# Patient Record
Sex: MALE | Race: WHITE | NOT HISPANIC OR LATINO
[De-identification: names, ages, dates, MRNs, and addresses within clinical notes are randomized per-mention and may not be internally consistent; named-entity substitution may affect disease eponyms.]

---

## 2017-02-22 ENCOUNTER — MEDICATION RENEWAL (OUTPATIENT)
Age: 62
End: 2017-02-22

## 2017-05-15 ENCOUNTER — MEDICATION RENEWAL (OUTPATIENT)
Age: 62
End: 2017-05-15

## 2017-07-19 ENCOUNTER — MEDICATION RENEWAL (OUTPATIENT)
Age: 62
End: 2017-07-19

## 2017-10-04 ENCOUNTER — MEDICATION RENEWAL (OUTPATIENT)
Age: 62
End: 2017-10-04

## 2018-01-12 ENCOUNTER — RX RENEWAL (OUTPATIENT)
Age: 63
End: 2018-01-12

## 2018-03-29 ENCOUNTER — RX RENEWAL (OUTPATIENT)
Age: 63
End: 2018-03-29

## 2018-11-09 ENCOUNTER — RX RENEWAL (OUTPATIENT)
Age: 63
End: 2018-11-09

## 2019-06-27 ENCOUNTER — RX RENEWAL (OUTPATIENT)
Age: 64
End: 2019-06-27

## 2019-06-28 ENCOUNTER — RX RENEWAL (OUTPATIENT)
Age: 64
End: 2019-06-28

## 2019-07-16 ENCOUNTER — APPOINTMENT (OUTPATIENT)
Dept: PULMONOLOGY | Facility: CLINIC | Age: 64
End: 2019-07-16

## 2020-03-11 ENCOUNTER — RX RENEWAL (OUTPATIENT)
Age: 65
End: 2020-03-11

## 2020-05-01 ENCOUNTER — APPOINTMENT (OUTPATIENT)
Dept: PULMONOLOGY | Facility: CLINIC | Age: 65
End: 2020-05-01

## 2020-12-05 ENCOUNTER — TRANSCRIPTION ENCOUNTER (OUTPATIENT)
Age: 65
End: 2020-12-05

## 2022-06-10 ENCOUNTER — INPATIENT (INPATIENT)
Facility: HOSPITAL | Age: 67
LOS: 13 days | Discharge: HOME | End: 2022-06-24
Attending: PSYCHIATRY & NEUROLOGY | Admitting: PSYCHIATRY & NEUROLOGY
Payer: COMMERCIAL

## 2022-06-10 VITALS
RESPIRATION RATE: 18 BRPM | HEART RATE: 88 BPM | SYSTOLIC BLOOD PRESSURE: 143 MMHG | TEMPERATURE: 99 F | DIASTOLIC BLOOD PRESSURE: 90 MMHG | OXYGEN SATURATION: 97 %

## 2022-06-10 PROCEDURE — 99285 EMERGENCY DEPT VISIT HI MDM: CPT

## 2022-06-10 PROCEDURE — 99053 MED SERV 10PM-8AM 24 HR FAC: CPT
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
## 2022-06-10 NOTE — ED BEHAVIORAL HEALTH ASSESSMENT NOTE - DESCRIPTION
on SSI s/p resection of meningioma,  over 30 years in the process of divorce, 1 adult son. TBI, hypothyroid, tinnitus, restless but cooperative   ICU Vital Signs Last 24 Hrs  T(C): 37.1 (10 Gavin 2022 20:35), Max: 37.1 (10 Gavin 2022 20:35)  T(F): 98.7 (10 Gavin 2022 20:35), Max: 98.7 (10 Gavin 2022 20:35)  HR: 88 (10 Gavin 2022 20:35) (88 - 88)  BP: 143/90 (10 Gavin 2022 20:35) (143/90 - 143/90)  BP(mean): --  ABP: --  ABP(mean): --  RR: 18 (10 Gavin 2022 20:35) (18 - 18)  SpO2: 97% (10 Gavin 2022 20:35) (97% - 97%)
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
## 2022-06-10 NOTE — ED PROVIDER NOTE - CLINICAL SUMMARY MEDICAL DECISION MAKING FREE TEXT BOX
Medical evaluation performed. There is no clinical evidence of intoxication or any acute medical problem requiring immediate intervention. Patient is awaiting psychiatric consultation. Final disposition will be determined by psychiatrist. 68 y/o M hx Pituitary Tumor, Seizure     Medical evaluation performed. There is no clinical evidence of intoxication or any acute medical problem requiring immediate intervention. Patient is awaiting psychiatric consultation. Final disposition will be determined by psychiatrist. 66 y/o M hx Pituitary Tumor, Seizure    Labs, Urine Tox/UA, EKG, CT head     Medical evaluation performed. There is no clinical evidence of intoxication or any acute medical problem requiring immediate intervention. Patient is awaiting psychiatric consultation. Final disposition will be determined by psychiatrist.
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
## 2022-06-10 NOTE — ED BEHAVIORAL HEALTH ASSESSMENT NOTE - CURRENT MEDICATION
Zoloft, Zoloft 200 mg daily, Vitamin D 50,000, Synthroid 200 mcg daily, testosterone, Lamictal 50 mg bid, proventil inhaler prn, Symbicort
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
## 2022-06-10 NOTE — ED BEHAVIORAL HEALTH ASSESSMENT NOTE - OTHER
family will remain in ED overnight loss of marriage, relationship, disabled becomes irritable easily.

## 2022-06-10 NOTE — ED PROVIDER NOTE - NSICDXPASTMEDICALHX_GEN_ALL_CORE_FT
PAST MEDICAL HISTORY:  Asthma     H/O: pituitary tumor     HLD (hyperlipidemia)     HTN (hypertension)     Major depression     Seizure
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
## 2022-06-10 NOTE — ED PROVIDER NOTE - PROGRESS NOTE DETAILS
Gong: Patient noted to attempt to elope from low acuity .  Pt left through ambulance emtrance and was stopped by staff in ambulance parking lot.  Pt aggressive trying to go home.  Now reporting shortness of breath demanding albuterol and hydrocortisone.  Pt denies chest pain, nausea, vomiting, fevers.  Brought back via stretcher back to .  Ordered home meds.  Reassess. Ativan ordered for patient.

## 2022-06-10 NOTE — ED BEHAVIORAL HEALTH ASSESSMENT NOTE - MEDICAL ISSUES AND PLAN (INCLUDE STANDING AND PRN MEDICATION)
continue Keppra 500mg bid, Synthroid 200 mcg daily, Lamictal 50 mg bid, Albuterol prn, Symbicort, Vitamin D 79666 units biweekly continue Keppra 500mg bid, Synthroid 200 mcg daily, Lamictal 50 mg bid, Albuterol prn, Symbicort, Vitamin D 20705 units biweekly Please verify all medications with patient's pharmacy.

## 2022-06-10 NOTE — ED ADULT TRIAGE NOTE - CHIEF COMPLAINT QUOTE
Brought in by EMS, c/o anxiety. Pt took Adderall for 8 yrs, was off medication for past 2 wks per PCP instructions. No complaints of chest pain, headache, nausea, dizziness, vomiting  SOB, fever, or chills. PMH pituitary tumor, meningioma, hypothyroidism, anxiety

## 2022-06-11 DIAGNOSIS — F43.20 ADJUSTMENT DISORDER, UNSPECIFIED: ICD-10-CM

## 2022-06-11 DIAGNOSIS — S06.9X9A UNSPECIFIED INTRACRANIAL INJURY WITH LOSS OF CONSCIOUSNESS OF UNSPECIFIED DURATION, INITIAL ENCOUNTER: ICD-10-CM

## 2022-06-11 DIAGNOSIS — F39 UNSPECIFIED MOOD [AFFECTIVE] DISORDER: ICD-10-CM

## 2022-06-11 LAB
ALBUMIN SERPL ELPH-MCNC: 4.3 G/DL — SIGNIFICANT CHANGE UP (ref 3.3–5)
ALP SERPL-CCNC: 77 U/L — SIGNIFICANT CHANGE UP (ref 40–120)
ALT FLD-CCNC: 31 U/L — SIGNIFICANT CHANGE UP (ref 4–41)
ANION GAP SERPL CALC-SCNC: 10 MMOL/L — SIGNIFICANT CHANGE UP (ref 7–14)
APAP SERPL-MCNC: <10 UG/ML — LOW (ref 15–25)
AST SERPL-CCNC: 33 U/L — SIGNIFICANT CHANGE UP (ref 4–40)
BASOPHILS # BLD AUTO: 0.03 K/UL — SIGNIFICANT CHANGE UP (ref 0–0.2)
BASOPHILS NFR BLD AUTO: 0.4 % — SIGNIFICANT CHANGE UP (ref 0–2)
BILIRUB SERPL-MCNC: 0.3 MG/DL — SIGNIFICANT CHANGE UP (ref 0.2–1.2)
BUN SERPL-MCNC: 17 MG/DL — SIGNIFICANT CHANGE UP (ref 7–23)
CALCIUM SERPL-MCNC: 9.5 MG/DL — SIGNIFICANT CHANGE UP (ref 8.4–10.5)
CHLORIDE SERPL-SCNC: 105 MMOL/L — SIGNIFICANT CHANGE UP (ref 98–107)
CO2 SERPL-SCNC: 26 MMOL/L — SIGNIFICANT CHANGE UP (ref 22–31)
CREAT SERPL-MCNC: 1.11 MG/DL — SIGNIFICANT CHANGE UP (ref 0.5–1.3)
EGFR: 73 ML/MIN/1.73M2 — SIGNIFICANT CHANGE UP
EOSINOPHIL # BLD AUTO: 0.25 K/UL — SIGNIFICANT CHANGE UP (ref 0–0.5)
EOSINOPHIL NFR BLD AUTO: 3.5 % — SIGNIFICANT CHANGE UP (ref 0–6)
ETHANOL SERPL-MCNC: <10 MG/DL — SIGNIFICANT CHANGE UP
FLUAV AG NPH QL: SIGNIFICANT CHANGE UP
FLUBV AG NPH QL: SIGNIFICANT CHANGE UP
GLUCOSE SERPL-MCNC: 90 MG/DL — SIGNIFICANT CHANGE UP (ref 70–99)
HCT VFR BLD CALC: 48.9 % — SIGNIFICANT CHANGE UP (ref 39–50)
HGB BLD-MCNC: 16.3 G/DL — SIGNIFICANT CHANGE UP (ref 13–17)
IANC: 4.3 K/UL — SIGNIFICANT CHANGE UP (ref 1.8–7.4)
IMM GRANULOCYTES NFR BLD AUTO: 0.4 % — SIGNIFICANT CHANGE UP (ref 0–1.5)
LYMPHOCYTES # BLD AUTO: 1.92 K/UL — SIGNIFICANT CHANGE UP (ref 1–3.3)
LYMPHOCYTES # BLD AUTO: 26.9 % — SIGNIFICANT CHANGE UP (ref 13–44)
MCHC RBC-ENTMCNC: 29.6 PG — SIGNIFICANT CHANGE UP (ref 27–34)
MCHC RBC-ENTMCNC: 33.3 GM/DL — SIGNIFICANT CHANGE UP (ref 32–36)
MCV RBC AUTO: 88.7 FL — SIGNIFICANT CHANGE UP (ref 80–100)
MONOCYTES # BLD AUTO: 0.61 K/UL — SIGNIFICANT CHANGE UP (ref 0–0.9)
MONOCYTES NFR BLD AUTO: 8.5 % — SIGNIFICANT CHANGE UP (ref 2–14)
NEUTROPHILS # BLD AUTO: 4.3 K/UL — SIGNIFICANT CHANGE UP (ref 1.8–7.4)
NEUTROPHILS NFR BLD AUTO: 60.3 % — SIGNIFICANT CHANGE UP (ref 43–77)
NRBC # BLD: 0 /100 WBCS — SIGNIFICANT CHANGE UP
NRBC # FLD: 0 K/UL — SIGNIFICANT CHANGE UP
PLATELET # BLD AUTO: 296 K/UL — SIGNIFICANT CHANGE UP (ref 150–400)
POTASSIUM SERPL-MCNC: 3.8 MMOL/L — SIGNIFICANT CHANGE UP (ref 3.5–5.3)
POTASSIUM SERPL-SCNC: 3.8 MMOL/L — SIGNIFICANT CHANGE UP (ref 3.5–5.3)
PROT SERPL-MCNC: 6.9 G/DL — SIGNIFICANT CHANGE UP (ref 6–8.3)
RBC # BLD: 5.51 M/UL — SIGNIFICANT CHANGE UP (ref 4.2–5.8)
RBC # FLD: 12.7 % — SIGNIFICANT CHANGE UP (ref 10.3–14.5)
RSV RNA NPH QL NAA+NON-PROBE: SIGNIFICANT CHANGE UP
SALICYLATES SERPL-MCNC: <0.3 MG/DL — LOW (ref 15–30)
SARS-COV-2 RNA SPEC QL NAA+PROBE: SIGNIFICANT CHANGE UP
SODIUM SERPL-SCNC: 141 MMOL/L — SIGNIFICANT CHANGE UP (ref 135–145)
TOXICOLOGY SCREEN, DRUGS OF ABUSE, SERUM RESULT: SIGNIFICANT CHANGE UP
TSH SERPL-MCNC: <0.1 UIU/ML — LOW (ref 0.27–4.2)
WBC # BLD: 7.14 K/UL — SIGNIFICANT CHANGE UP (ref 3.8–10.5)
WBC # FLD AUTO: 7.14 K/UL — SIGNIFICANT CHANGE UP (ref 3.8–10.5)

## 2022-06-11 PROCEDURE — 70450 CT HEAD/BRAIN W/O DYE: CPT | Mod: 26,MA

## 2022-06-11 RX ORDER — LAMOTRIGINE 25 MG/1
50 TABLET, ORALLY DISINTEGRATING ORAL
Refills: 0 | Status: DISCONTINUED | OUTPATIENT
Start: 2022-06-11 | End: 2022-06-13

## 2022-06-11 RX ORDER — LEVOTHYROXINE SODIUM 125 MCG
200 TABLET ORAL DAILY
Refills: 0 | Status: DISCONTINUED | OUTPATIENT
Start: 2022-06-12 | End: 2022-06-12

## 2022-06-11 RX ORDER — DIPHENHYDRAMINE HCL 50 MG
50 CAPSULE ORAL EVERY 8 HOURS
Refills: 0 | Status: DISCONTINUED | OUTPATIENT
Start: 2022-06-11 | End: 2022-06-13

## 2022-06-11 RX ORDER — HALOPERIDOL DECANOATE 100 MG/ML
5 INJECTION INTRAMUSCULAR ONCE
Refills: 0 | Status: DISCONTINUED | OUTPATIENT
Start: 2022-06-11 | End: 2022-06-11

## 2022-06-11 RX ORDER — DIPHENHYDRAMINE HCL 50 MG
50 CAPSULE ORAL ONCE
Refills: 0 | Status: DISCONTINUED | OUTPATIENT
Start: 2022-06-11 | End: 2022-06-11

## 2022-06-11 RX ORDER — ALBUTEROL 90 UG/1
1 AEROSOL, METERED ORAL ONCE
Refills: 0 | Status: DISCONTINUED | OUTPATIENT
Start: 2022-06-11 | End: 2022-06-24

## 2022-06-11 RX ORDER — INFLUENZA VIRUS VACCINE 15; 15; 15; 15 UG/.5ML; UG/.5ML; UG/.5ML; UG/.5ML
0.7 SUSPENSION INTRAMUSCULAR ONCE
Refills: 0 | Status: COMPLETED | OUTPATIENT
Start: 2022-06-11 | End: 2022-06-11

## 2022-06-11 RX ORDER — LEVETIRACETAM 250 MG/1
500 TABLET, FILM COATED ORAL
Refills: 0 | Status: DISCONTINUED | OUTPATIENT
Start: 2022-06-11 | End: 2022-06-11

## 2022-06-11 RX ORDER — ALBUTEROL 90 UG/1
2 AEROSOL, METERED ORAL EVERY 6 HOURS
Refills: 0 | Status: DISCONTINUED | OUTPATIENT
Start: 2022-06-11 | End: 2022-06-11

## 2022-06-11 RX ORDER — LEVETIRACETAM 250 MG/1
500 TABLET, FILM COATED ORAL
Refills: 0 | Status: DISCONTINUED | OUTPATIENT
Start: 2022-06-11 | End: 2022-06-24

## 2022-06-11 RX ORDER — LEVOTHYROXINE SODIUM 125 MCG
200 TABLET ORAL DAILY
Refills: 0 | Status: DISCONTINUED | OUTPATIENT
Start: 2022-06-11 | End: 2022-06-11

## 2022-06-11 RX ORDER — HYDROCORTISONE 20 MG
5 TABLET ORAL ONCE
Refills: 0 | Status: COMPLETED | OUTPATIENT
Start: 2022-06-11 | End: 2022-06-11

## 2022-06-11 RX ORDER — LEVOTHYROXINE SODIUM 125 MCG
200 TABLET ORAL ONCE
Refills: 0 | Status: COMPLETED | OUTPATIENT
Start: 2022-06-11 | End: 2022-06-11

## 2022-06-11 RX ORDER — LAMOTRIGINE 25 MG/1
50 TABLET, ORALLY DISINTEGRATING ORAL ONCE
Refills: 0 | Status: COMPLETED | OUTPATIENT
Start: 2022-06-11 | End: 2022-06-11

## 2022-06-11 RX ORDER — ACETAMINOPHEN 500 MG
650 TABLET ORAL EVERY 6 HOURS
Refills: 0 | Status: DISCONTINUED | OUTPATIENT
Start: 2022-06-11 | End: 2022-06-24

## 2022-06-11 RX ADMIN — Medication 5 MILLIGRAM(S): at 02:13

## 2022-06-11 RX ADMIN — LEVETIRACETAM 500 MILLIGRAM(S): 250 TABLET, FILM COATED ORAL at 12:58

## 2022-06-11 RX ADMIN — Medication 650 MILLIGRAM(S): at 22:27

## 2022-06-11 RX ADMIN — LAMOTRIGINE 50 MILLIGRAM(S): 25 TABLET, ORALLY DISINTEGRATING ORAL at 20:48

## 2022-06-11 RX ADMIN — LEVETIRACETAM 500 MILLIGRAM(S): 250 TABLET, FILM COATED ORAL at 20:48

## 2022-06-11 RX ADMIN — Medication 200 MICROGRAM(S): at 13:48

## 2022-06-11 RX ADMIN — Medication 2 MILLIGRAM(S): at 00:46

## 2022-06-11 RX ADMIN — LEVETIRACETAM 500 MILLIGRAM(S): 250 TABLET, FILM COATED ORAL at 02:13

## 2022-06-11 RX ADMIN — LAMOTRIGINE 50 MILLIGRAM(S): 25 TABLET, ORALLY DISINTEGRATING ORAL at 12:58

## 2022-06-11 RX ADMIN — Medication 2 MILLIGRAM(S): at 13:05

## 2022-06-11 RX ADMIN — ALBUTEROL 2 PUFF(S): 90 AEROSOL, METERED ORAL at 00:47

## 2022-06-11 RX ADMIN — Medication 650 MILLIGRAM(S): at 23:25

## 2022-06-11 RX ADMIN — Medication 2 MILLIGRAM(S): at 20:49

## 2022-06-11 RX ADMIN — LAMOTRIGINE 50 MILLIGRAM(S): 25 TABLET, ORALLY DISINTEGRATING ORAL at 02:13

## 2022-06-11 RX ADMIN — Medication 50 MILLIGRAM(S): at 20:48

## 2022-06-11 NOTE — PSYCHIATRIC REHAB INITIAL EVALUATION - NSBHALCSUBUSE_PSY_ALL_CORE
Per medical record, pt uses marijuana 5x a day./Yes... Per medical record, pt uses marijuana 5x a day. Per collateral of pt's out-pt psychiatrist, pt also uses ETOH daily./Yes...

## 2022-06-11 NOTE — ED ADULT NURSE REASSESSMENT NOTE - NS ED NURSE REASSESS COMMENT FT1
RN Rounding Note 4:30am- Pt sleeping, respirations even and non labored. Pt awaiting inpatient psychiatric bed when available.

## 2022-06-11 NOTE — ED BEHAVIORAL HEALTH NOTE - BEHAVIORAL HEALTH NOTE
SW called pt's insurance company, [employer], at [phone], was told to call after hours # [phone].  CARY called after hours #, spoke with representative, Florence.  As per Florence, she is unable to provide auth at this time as she does not have access to the member information in the system-she requested SW leave a message in the general mailbox for follow up on Monday.  CARY called 888 # and left a message with information regarding pt admission to [facility] and requested follow up for authorization for inpatient admission.

## 2022-06-11 NOTE — BH PATIENT PROFILE - NSICDXPASTMEDICALHX_GEN_ALL_CORE_FT
PAST MEDICAL HISTORY:  Asthma     H/O: pituitary tumor     HLD (hyperlipidemia)     HTN (hypertension)     Major depression     S/P resection of meningioma     Seizure

## 2022-06-11 NOTE — ED BEHAVIORAL HEALTH NOTE - BEHAVIORAL HEALTH NOTE
COVID Exposure Screen- Patient  1.	*Have you had a COVID-19 test in the last 90 days?  (  ) Yes   (  X  ) No   (  ) Unknown- Reason: _____  IF YES PROCEED TO QUESTION #2. IF NO OR UNKNOWN, PLEASE SKIP TO QUESTION #3.  2.	Date of test(s) and result(s): ________  3.	*Have you tested positive for COVID-19 antibodies? (  ) Yes   (  ) No   (  ) Unknown- Reason: _____  IF YES PROCEED TO QUESTION #4. IF NO or UNKNOWN, PLEASE SKIP TO QUESTION #5.  4.	Date of positive antibody test: ________  5.	*Have you received 2 doses of the COVID-19 vaccine? ( X  ) Yes   (  ) No   (  ) Unknown- Reason: _____   IF YES PROCEED TO QUESTION #6. IF NO or UNKNOWN, PLEASE SKIP TO QUESTION #7.  6.	Date of second dose: ____does not remember, he also received a booster____  7.	*In the past 10 days, have you been around anyone with a positive COVID-19 test?* (  ) Yes   ( X  ) No   (  ) Unknown- Reason: ____  IF YES PROCEED TO QUESTION #8. IF NO or UNKNOWN, PLEASE SKIP TO QUESTION #13.  8.	Were you within 6 feet of them for at least 15 minutes? (  ) Yes   (  ) No   (  ) Unknown- Reason: _____  9.	Have you provided care for them? (  ) Yes   (  ) No   (  ) Unknown- Reason: ______  10.	Have you had direct physical contact with them (touched, hugged, or kissed them)? (  ) Yes   (  ) No    (  ) Unknown- Reason: _____  11.	Have you shared eating or drinking utensils with them? (  ) Yes   (  ) No    (  ) Unknown- Reason: ____  12.	Have they sneezed, coughed, or somehow gotten respiratory droplets on you? (  ) Yes   (  ) No    (  ) Unknown- Reason: ______  13.	*Have you been out of [place] within the past 10 days?* (  ) Yes   (  ) No   ( X  ) Unknown- Reason: _PATIENT IS UNCOOPERATIVE ____  IF YES PLEASE ANSWER THE FOLLOWING QUESTIONS:  14.	Which state/country have you been to? ______  15.	Were you there over 24 hours? (  ) Yes   (  ) No    (  ) Unknown- Reason: ______  16.	Date of return to [facility]: ______     COVID Exposure Screen- collateral (i.e. third-party, chart review, belongings, etc; include EMS and ED staff)  1.	*Has the patient had a COVID-19 test in the last 90 days?  (  ) Yes   (  ) No   (  ) Unknown- Reason: _____  IF YES PROCEED TO QUESTION #2. IF NO OR UNKNOWN, PLEASE SKIP TO QUESTION #3.  2.	Date of test(s) and result(s): ________  3.	*Has the patient tested positive for COVID-19 antibodies? (  ) Yes   (  ) No   (  ) Unknown- Reason: _____  IF YES PROCEED TO QUESTION #4. IF NO or UNKNOWN, PLEASE SKIP TO QUESTION #5.  4.	Date of positive antibody test: ________  5.	*Has the patient received 2 doses of the COVID-19 vaccine? (  ) Yes   (  ) No   (  ) Unknown- Reason: _____  IF YES PROCEED TO QUESTION #6. IF NO or UNKNOWN, PLEASE SKIP TO QUESTION #7.  6.	 Date of second dose: ________  7.	*In the past 10 days, has the patient been around anyone with a positive COVID-19 test?* (  ) Yes   (  ) No   (  ) Unknown- Reason: __  IF YES PROCEED TO QUESTION #8. IF NO or UNKNOWN, PLEASE SKIP TO QUESTION #13.  8.	Was the patient within 6 feet of them for at least 15 minutes? (  ) Yes   (  ) No   (  ) Unknown- Reason: _____  9.	Did the patient provide care for them? (  ) Yes   (  ) No   (  ) Unknown- Reason: ______  10.	Did the patient have direct physical contact with them (touched, hugged, or kissed them)? (  ) Yes   (  ) No    (  ) Unknown- Reason: __  11.	Did the patient share eating or drinking utensils with them? (  ) Yes   (  ) No    (  ) Unknown- Reason: ____  12.	Did they sneeze, cough, or somehow get respiratory droplets on the patient? (  ) Yes   (  ) No    (  ) Unknown- Reason: ______  13.	*Has the patient been out of [place] within the past 10 days?* (  ) Yes   (  ) No   (  ) Unknown- Reason: _____  IF YES PLEASE ANSWER THE FOLLOWING QUESTIONS:  14.	Which state/country did they go to? ______  15.	Were they there over 24 hours? (  ) Yes   (  ) No    (  ) Unknown- Reason: ______  16.	Date of return to [facility]: ______

## 2022-06-11 NOTE — PSYCHIATRIC REHAB INITIAL EVALUATION - NSBHEDULITERACY_PSY_ALL_CORE
TRANSFER - OUT REPORT:    Verbal report given to Ramón(name) on Zaira Da Silva  being transferred to (unit) for routine post - op       Report consisted of patients Situation, Background, Assessment and   Recommendations(SBAR). Time Pre op antibiotic given: Ancef 1041/vanc [id number]  Anesthesia Stop time: 5888  Petty Present on Transfer to floor:yes  Order for Petty on Chart:yes  Discharge Prescriptions with Chart:no    Information from the following report(s) SBAR, OR Summary, MAR and Cardiac Rhythm nsr was reviewed with the receiving nurse. Opportunity for questions and clarification was provided. Is the patient on 02? NO        Is the patient on a monitor? NO    Is the nurse transporting with the patient? NO    Surgical Waiting Area notified of patient's transfer from PACU? YES; no one checked in    The following personal items collected during your admission accompanied patient upon transfer:   Dental Appliance: Dental Appliances: None  Vision:    Hearing Aid:    Jewelry: Jewelry: None  Clothing: Clothing: (suitcase, glasses, cane, bag of belongings returned in [employer])  Other Valuables:  Other Valuables: Eyeglasses(glasses and inhaler to pacu.)  Valuables sent to safe: Able to read and write English

## 2022-06-11 NOTE — BH PATIENT PROFILE - FALL HARM RISK - UNIVERSAL INTERVENTIONS
Bed in lowest position, wheels locked, appropriate side rails in place/Call bell, personal items and telephone in reach/Instruct patient to call for assistance before getting out of bed or chair/Non-slip footwear when patient is out of bed/[place] to call system/Physically safe environment - no spills, clutter or unnecessary equipment/Purposeful Proactive Rounding/Room/bathroom lighting operational, light cord in reach
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
## 2022-06-11 NOTE — ED ADULT NURSE REASSESSMENT NOTE - NS ED NURSE REASSESS COMMENT FT1
Received report from night RN pt irritable requesting to be d/c pt denies si/hi/avh presently, safety & comfort measures maintained eval on going.

## 2022-06-11 NOTE — PSYCHIATRIC REHAB INITIAL EVALUATION - NSBHPRRECOMMEND_PSY_ALL_CORE
Patient is a 67 year old, male; domicile with spouse, currently going through a divorce, on SSI, with a history of depression, + cannabis use, brought in by EMS and [employer]; which was activated by son; for agitation and physical aggression.  Patient is a 67 year old, male; domicile with spouse, currently going through a divorce, on SSI, with a history of depression, + cannabis and ETOH use, brought in by EMS and [employer]; which was activated by son; for agitation and physical aggression.  Per nursing report, pt easily became agitated earlier in the day demanding to leave the hospital. Writer attempted to talk to pt, however, pt was sleeping, therefore a member of psychiatric rehabilitation will introduce psychiatric rehabilitation staff and services, provide patient with a copy of the unit schedule, and encourage patient to attend psychiatric rehabilitation activities and engage in treatment.  Psychiatric Rehabilitation staff will continue to engage patient daily in order to develop therapeutic rapport.

## 2022-06-11 NOTE — BH PATIENT PROFILE - NSPROHMSYMPCOND_GEN_A_NUR
The left coronary artery was selectively engaged and injected. A Catheter Std Jl Cor 4 Crv 6fr 100cm Supertorque was used. Multiple views of the injected vessel were taken.  respiratory

## 2022-06-12 ENCOUNTER — EMERGENCY (EMERGENCY)
Facility: HOSPITAL | Age: 67
LOS: 1 days | Discharge: HOME | End: 2022-06-12
Admitting: EMERGENCY MEDICINE
Payer: COMMERCIAL

## 2022-06-12 VITALS
RESPIRATION RATE: 16 BRPM | OXYGEN SATURATION: 100 % | DIASTOLIC BLOOD PRESSURE: 94 MMHG | TEMPERATURE: 98 F | HEART RATE: 80 BPM | SYSTOLIC BLOOD PRESSURE: 138 MMHG

## 2022-06-12 VITALS
HEART RATE: 83 BPM | RESPIRATION RATE: 14 BRPM | SYSTOLIC BLOOD PRESSURE: 141 MMHG | DIASTOLIC BLOOD PRESSURE: 95 MMHG | OXYGEN SATURATION: 100 %

## 2022-06-12 DIAGNOSIS — F12.10 CANNABIS ABUSE, UNCOMPLICATED: ICD-10-CM

## 2022-06-12 DIAGNOSIS — F10.10 ALCOHOL ABUSE, UNCOMPLICATED: ICD-10-CM

## 2022-06-12 DIAGNOSIS — Z98.890 OTHER SPECIFIED POSTPROCEDURAL STATES: Chronic | ICD-10-CM

## 2022-06-12 DIAGNOSIS — F29 UNSPECIFIED PSYCHOSIS NOT DUE TO A SUBSTANCE OR KNOWN PHYSIOLOGICAL CONDITION: ICD-10-CM

## 2022-06-12 PROBLEM — Z87.898 PERSONAL HISTORY OF OTHER SPECIFIED CONDITIONS: Chronic | Status: ACTIVE | Noted: 2022-06-10

## 2022-06-12 PROBLEM — R56.9 UNSPECIFIED CONVULSIONS: Chronic | Status: ACTIVE | Noted: 2022-06-10

## 2022-06-12 PROBLEM — J45.909 UNSPECIFIED ASTHMA, UNCOMPLICATED: Chronic | Status: ACTIVE | Noted: 2022-06-10

## 2022-06-12 PROBLEM — I10 ESSENTIAL (PRIMARY) HYPERTENSION: Chronic | Status: ACTIVE | Noted: 2022-06-10

## 2022-06-12 PROBLEM — E78.5 HYPERLIPIDEMIA, UNSPECIFIED: Chronic | Status: ACTIVE | Noted: 2022-06-10

## 2022-06-12 PROBLEM — F32.9 MAJOR DEPRESSIVE DISORDER, SINGLE EPISODE, UNSPECIFIED: Chronic | Status: ACTIVE | Noted: 2022-06-10

## 2022-06-12 PROCEDURE — 73130 X-RAY EXAM OF HAND: CPT | Mod: 26,RT

## 2022-06-12 PROCEDURE — 99222 1ST HOSP IP/OBS MODERATE 55: CPT

## 2022-06-12 PROCEDURE — 99284 EMERGENCY DEPT VISIT MOD MDM: CPT

## 2022-06-12 RX ORDER — LEVOTHYROXINE SODIUM 125 MCG
400 TABLET ORAL
Refills: 0 | Status: DISCONTINUED | OUTPATIENT
Start: 2022-06-12 | End: 2022-06-24

## 2022-06-12 RX ORDER — HYDROCORTISONE 20 MG
10 TABLET ORAL DAILY
Refills: 0 | Status: DISCONTINUED | OUTPATIENT
Start: 2022-06-12 | End: 2022-06-24

## 2022-06-12 RX ORDER — HYDROCORTISONE 20 MG
1 TABLET ORAL
Qty: 0 | Refills: 0 | DISCHARGE

## 2022-06-12 RX ORDER — SOMATROPIN 10 MG
0.4 KIT SUBCUTANEOUS
Qty: 0 | Refills: 0 | DISCHARGE

## 2022-06-12 RX ORDER — MOMETASONE FUROATE AND FORMOTEROL FUMARATE DIHYDRATE 200; 5 UG/1; UG/1
2 AEROSOL RESPIRATORY (INHALATION)
Qty: 0 | Refills: 0 | DISCHARGE

## 2022-06-12 RX ORDER — LEVETIRACETAM 250 MG/1
1 TABLET, FILM COATED ORAL
Qty: 0 | Refills: 0 | DISCHARGE

## 2022-06-12 RX ORDER — LEVOTHYROXINE SODIUM 125 MCG
2 TABLET ORAL
Qty: 0 | Refills: 0 | DISCHARGE

## 2022-06-12 RX ORDER — LEVOTHYROXINE SODIUM 125 MCG
1 TABLET ORAL
Qty: 0 | Refills: 0 | DISCHARGE

## 2022-06-12 RX ORDER — IBUPROFEN 200 MG
600 TABLET ORAL ONCE
Refills: 0 | Status: COMPLETED | OUTPATIENT
Start: 2022-06-12 | End: 2022-06-12

## 2022-06-12 RX ORDER — SERTRALINE 25 MG/1
200 TABLET, FILM COATED ORAL DAILY
Refills: 0 | Status: DISCONTINUED | OUTPATIENT
Start: 2022-06-12 | End: 2022-06-14

## 2022-06-12 RX ORDER — LEVOTHYROXINE SODIUM 125 MCG
200 TABLET ORAL
Refills: 0 | Status: DISCONTINUED | OUTPATIENT
Start: 2022-06-12 | End: 2022-06-24

## 2022-06-12 RX ORDER — HYDROCORTISONE 20 MG
5 TABLET ORAL AT BEDTIME
Refills: 0 | Status: DISCONTINUED | OUTPATIENT
Start: 2022-06-12 | End: 2022-06-24

## 2022-06-12 RX ORDER — LEVOTHYROXINE SODIUM 125 MCG
200 TABLET ORAL ONCE
Refills: 0 | Status: COMPLETED | OUTPATIENT
Start: 2022-06-12 | End: 2022-06-13

## 2022-06-12 RX ADMIN — Medication 2 MILLIGRAM(S): at 06:08

## 2022-06-12 RX ADMIN — LEVETIRACETAM 500 MILLIGRAM(S): 250 TABLET, FILM COATED ORAL at 20:28

## 2022-06-12 RX ADMIN — Medication 200 MICROGRAM(S): at 06:08

## 2022-06-12 RX ADMIN — Medication 5 MILLIGRAM(S): at 20:28

## 2022-06-12 RX ADMIN — Medication 600 MILLIGRAM(S): at 12:49

## 2022-06-12 RX ADMIN — Medication 1 MILLIGRAM(S): at 20:28

## 2022-06-12 RX ADMIN — Medication 650 MILLIGRAM(S): at 20:28

## 2022-06-12 RX ADMIN — Medication 2 MILLIGRAM(S): at 12:30

## 2022-06-12 RX ADMIN — LAMOTRIGINE 50 MILLIGRAM(S): 25 TABLET, ORALLY DISINTEGRATING ORAL at 20:28

## 2022-06-12 RX ADMIN — LEVETIRACETAM 500 MILLIGRAM(S): 250 TABLET, FILM COATED ORAL at 08:35

## 2022-06-12 RX ADMIN — Medication 650 MILLIGRAM(S): at 21:30

## 2022-06-12 RX ADMIN — Medication 10 MILLIGRAM(S): at 10:43

## 2022-06-12 RX ADMIN — LAMOTRIGINE 50 MILLIGRAM(S): 25 TABLET, ORALLY DISINTEGRATING ORAL at 08:35

## 2022-06-12 NOTE — ED PROVIDER NOTE - PATIENT PORTAL LINK FT
You can access the FollowMyHealth Patient Portal offered by [facility] by registering at the following website: [email or web address]. By joining [employer]’s FollowMyHealth portal, you will also be able to view your health information using other applications (apps) compatible with our system.
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
## 2022-06-12 NOTE — ED ADULT NURSE NOTE - NS ED PATIENT SAFETY CONCERN
EMS was called to home for AMS possible cardiac arrest. Pt Had seizure enroute to hospitpal. Recent carotid surgery. Pt post ictal on arrival.    No

## 2022-06-12 NOTE — BH INPATIENT PSYCHIATRY ASSESSMENT NOTE - VIOLENCE RISK FACTORS:
Feeling of being under threat and being unable to control threat/Substance abuse/Irritability/Elopement history or risk

## 2022-06-12 NOTE — BH INPATIENT PSYCHIATRY ASSESSMENT NOTE - RISK ASSESSMENT
substance use, h/o anger, agitation and most recent aggression towards family deems patient an acute risk to others; he did make suicidal statements per family but he denies this now, no known h/o depression or suicide attempts

## 2022-06-12 NOTE — BH INPATIENT PSYCHIATRY ASSESSMENT NOTE - NSBHMETABOLIC_PSY_ALL_CORE_FT
BMI:   HbA1c:   Glucose:   BP: 110/78 (06-12-22 @ 04:00) (110/78 - 156/78)  Lipid Panel:     BP: 110/78 (06-12-22 @ 04:00) (110/78 - 156/78)

## 2022-06-12 NOTE — ED ADULT NURSE NOTE - CHIEF COMPLAINT QUOTE
Patient was sent in from [address] in-patient unit for right hand injury that occurred yesterday. Patient is calm and cooperative.

## 2022-06-12 NOTE — BH INPATIENT PSYCHIATRY ASSESSMENT NOTE - MSE UNSTRUCTURED FT
Appearance: basic grooming and hygiene intact, no abnormal involuntary movements noted, stable gait  Behavior: some increased psychomotor activity (gesturing quite a bit despite injured right hand), dominates conversation  Speech: wnl, somewhat loud at times  Mood: frustrated  Affect: congruent, reactive, increased intensity  Thought process: illogical, perseverative  Thought content: paranoid delusions remain; denies SI/HI  Perceptual disturbances: denies hallucinations and does not appear to be internally preoccupied  Orientation: oriented to all spheres  Abstraction: intact  Fund of knowledge: adequate  Insight: poor  Judgement: poor

## 2022-06-12 NOTE — BH INPATIENT PSYCHIATRY ASSESSMENT NOTE - DESCRIPTION
on SSI s/p resection of meningioma,  over 30 years in the process of divorce, 1 adult son. Was an , now on SSI s/p resection of meningioma,  over 30 years in the process of divorce, 1 adult son who lives in [place] but is currently visiting.

## 2022-06-12 NOTE — ED PROVIDER NOTE - OBJECTIVE STATEMENT
68 y/o M hx Pituitary Tumor, Seizure  BIBA from [address] [place] [address]  secondary to  right thumb pain . Patient  c/o pain 2/10.  Patient noted that he hit his finger on a wall while wrestling with staff 2 days ago.  Admit to pain with minimal movement of the thumb.    Denies numbness  and tingling to area. Denies SOB, fever, chills, chest/abdominal discomfort.  No evidence of physical injuries, broken skin or deformities.

## 2022-06-12 NOTE — BH INPATIENT PSYCHIATRY ASSESSMENT NOTE - OTHER PAST PSYCHIATRIC HISTORY (INCLUDE DETAILS REGARDING ONSET, COURSE OF ILLNESS, INPATIENT/OUTPATIENT TREATMENT)
previously prescribed Adderall- had seizure Collateral information from outpatient psychiatrist Dr. Dipika Valadez and spouse documented in ED notes summarized here:    No prior admissions or suicide attempts. Change in behavior (aggression, hostility) started after large meningioma resection and radiation in 2011. No known psychiatric treatment prior to this. In treatment with Dr. Valadez for past 8 years. Was on zoloft 200mg po daily and adderall 45mg po daily for intermittent episodes of rage but Adderall discontinued a month ago due to escalation of behaviors. Consumes alcohol, about 5-6 drinks daily as well as cannabis use

## 2022-06-12 NOTE — ED ADULT TRIAGE NOTE - CHIEF COMPLAINT QUOTE
Patient was sent in from [address] in-patient unit for right hand injury that occurred yesterday. Patient is calm and cooperative.

## 2022-06-12 NOTE — BH INPATIENT PSYCHIATRY ASSESSMENT NOTE - CURRENT MEDICATION
MEDICATIONS  (STANDING):  hydrocortisone 5 milliGRAM(s) Oral at bedtime  hydrocortisone 10 milliGRAM(s) Oral daily  lamoTRIgine 50 milliGRAM(s) Oral two times a day  levETIRAcetam 500 milliGRAM(s) Oral two times a day  levothyroxine 200 MICROGram(s) Oral daily    MEDICATIONS  (PRN):  acetaminophen     Tablet .. 650 milliGRAM(s) Oral every 6 hours PRN Mild Pain (1 - 3), Moderate Pain (4 - 6)  ALBUTerol    90 MICROgram(s) HFA Inhaler 1 Puff(s) Inhalation Once PRN Bronchospasm  diphenhydrAMINE 50 milliGRAM(s) Oral every 8 hours PRN Rash and/or Itching  LORazepam     Tablet 2 milliGRAM(s) Oral every 6 hours PRN agitation

## 2022-06-12 NOTE — ED PROVIDER NOTE - CLINICAL SUMMARY MEDICAL DECISION MAKING FREE TEXT BOX
X-ray right thumb , Motrin for pain. Right splint for immobilization. Hand surgery consulted . D/C to [facility]. Follow with orthopedic 66 y/o M hx Pituitary Tumor, Seizure  BIBA from [address] [place] [address] Inpatient  secondary to  right thumb pain    X-ray right thumb , Motrin for pain. Right splint for immobilization. Hand surgery consulted . D/C to [facility]. Follow with orthopedic

## 2022-06-12 NOTE — BH INPATIENT PSYCHIATRY ASSESSMENT NOTE - NSBHATTENDATTEST_PSY_ALL_CORE
After Visit Summary   6/18/2018    Jerel Day    MRN: [id number]           Patient Information     Date Of Birth          1995        Visit Information        Provider Department      6/18/2018 3:00 PM Shai Londono, [facility]        Today's Diagnoses     Schizoaffective disorder, bipolar type (H)    -  1       Follow-ups after your visit        Your next 10 appointments already scheduled     Jun 27, 2018  4:00 PM CDT   Navigate Psychotherapy with Shai Londono [facility] ([facility])    [address] [place] [address]   [phone]            Jun 27, 2018  5:15 PM CDT   Navigate Medication Follow Up with JAVY Bhatt [facility] ([facility])    [address] [place] [address]   [phone]            Jul 11, 2018  3:00 PM CDT   Navigate Psychotherapy with Shai Londono [facility] ([facility])    [address] [place] [address]   [phone]            Jul 11, 2018  4:15 PM CDT   Navigate Medication Follow Up with JAVY Bhatt [facility] ([facility])    [address] [place] [address]   [phone]            Jul 25, 2018  5:00 PM CDT   [facility]    [address] [place] [address]   [phone]            Jul 25, 2018  5:00 PM CDT   Navigate Psychotherapy with Shai Londono [facility] ([facility])    [address] [place] [address] [phone]              Who to contact     Please call your clinic at [phone] to:    Ask questions about your health    Make or cancel appointments    Discuss your medicines    Learn about your test results    Speak to your doctor            Additional Information About  Your Visit        Care EveryWhere ID     This is your Care EveryWhere ID. This could be used by other organizations to access your [place] medical records  [id number]         Blood Pressure from Last 3 Encounters:   05/30/18 107/61   05/23/18 100/60   05/23/18 114/60    Weight from Last 3 Encounters:   05/30/18 71.4 kg (157 lb 6.4 oz)   05/23/18 70.6 kg (155 lb 9.6 oz)   05/23/18 70.3 kg (155 lb)              Today, you had the following     No orders found for display       Primary Care Provider Office Phone # Fax #    [facility] [phone]       [address] [place] [address]        Equal Access to Services     MUNIRA MADDOX : Cristobal barrientoso Sobrittany, waaxda luqadaha, qaybta kaalmada adeegyada, keagan lu. So [facility] [phone].    ATENCIÓN: Si habla español, tiene a grimm disposición servicios gratuitos de asistencia lingüística. Llame al [phone].    We comply with applicable federal civil rights laws and [place] laws. We do not discriminate on the basis of race, color, national origin, age, disability, sex, sexual orientation, or gender identity.            Thank you!     Thank you for choosing [facility]  for your care. Our goal is always to provide you with excellent care. Hearing back from our patients is one way we can continue to improve our services. Please take a few minutes to complete the written survey that you may receive in the mail after your visit with us. Thank you!             Your Updated Medication List - Protect others around you: Learn how to safely use, store and throw away your medicines at www.disposemymeds.org.          This list is accurate as of 6/18/18 11:59 PM.  Always use your most recent med list.                   Brand Name Dispense Instructions for use Diagnosis    ARIPiprazole  MG extended release inj syringe    ABILIFY MAINTENA    1 Syringe    Inject 1 Syringe (400 mg) into the muscle  every 28 days    Schizoaffective disorder, bipolar type (H)       lithium 450 MG CR tablet    ESKALITH    60 tablet    Take 2 tablets (900 mg) by mouth At Bedtime    Schizoaffective disorder, bipolar type (H)          I have personally seen, examined and participated in the care of this patient. I have reviewed all pertinent clinical information, including history, physical exam, plan and the Medical/PA/NP Student’s note and agree except as noted.

## 2022-06-12 NOTE — BH INPATIENT PSYCHIATRY ASSESSMENT NOTE - NSICDXBHSECONDARYDX_PSY_ALL_CORE
TBI (traumatic brain injury)   S06.9X9A  Mild alcohol use disorder   F10.10  Mild cannabis use disorder   F12.10  Psychotic disorder   F29

## 2022-06-12 NOTE — BH INPATIENT PSYCHIATRY ASSESSMENT NOTE - NSBHCHARTREVIEWVS_PSY_A_CORE FT
Vital Signs Last 24 Hrs  T(C): 36.9 (06-12-22 @ 04:00), Max: 36.9 (06-12-22 @ 04:00)  T(F): 98.4 (06-12-22 @ 04:00), Max: 98.4 (06-12-22 @ 04:00)  HR: --  BP: 110/78 (06-12-22 @ 04:00) (110/78 - 110/78)  BP(mean): 94 (06-12-22 @ 04:00) (94 - 94)  RR: 16 (06-11-22 @ 12:13) (16 - 16)  SpO2: --    Orthostatic VS  06-11-22 @ 12:13  Lying BP: --/-- HR: --  Sitting BP: 141/94 HR: 83  Standing BP: 153/93 HR: 86  Site: --  Mode: --   Vital Signs Last 24 Hrs  T(C): 36.8 (06-12-22 @ 12:36), Max: 36.9 (06-12-22 @ 04:00)  T(F): 98.2 (06-12-22 @ 12:36), Max: 98.4 (06-12-22 @ 04:00)  HR: 80 (06-12-22 @ 12:36) (80 - 83)  BP: 138/94 (06-12-22 @ 12:36) (110/78 - 141/95)  BP(mean): 94 (06-12-22 @ 04:00) (94 - 94)  RR: 16 (06-12-22 @ 12:36) (14 - 16)  SpO2: 100% (06-12-22 @ 12:36) (100% - 100%)    Orthostatic VS  06-11-22 @ 12:13  Lying BP: --/-- HR: --  Sitting BP: 141/94 HR: 83  Standing BP: 153/93 HR: 86  Site: --  Mode: --
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
## 2022-06-13 DIAGNOSIS — F39 UNSPECIFIED MOOD [AFFECTIVE] DISORDER: ICD-10-CM

## 2022-06-13 LAB
COVID-19 SPIKE DOMAIN AB INTERP: POSITIVE
COVID-19 SPIKE DOMAIN ANTIBODY RESULT: >250 U/ML — HIGH
SARS-COV-2 IGG+IGM SERPL QL IA: >250 U/ML — HIGH
SARS-COV-2 IGG+IGM SERPL QL IA: POSITIVE

## 2022-06-13 RX ORDER — DIPHENHYDRAMINE HCL 50 MG
50 CAPSULE ORAL EVERY 6 HOURS
Refills: 0 | Status: DISCONTINUED | OUTPATIENT
Start: 2022-06-13 | End: 2022-06-24

## 2022-06-13 RX ORDER — DIPHENHYDRAMINE HCL 50 MG
50 CAPSULE ORAL ONCE
Refills: 0 | Status: DISCONTINUED | OUTPATIENT
Start: 2022-06-13 | End: 2022-06-24

## 2022-06-13 RX ORDER — MOMETASONE FUROATE AND FORMOTEROL FUMARATE DIHYDRATE 200; 5 UG/1; UG/1
2 AEROSOL RESPIRATORY (INHALATION)
Refills: 0 | Status: DISCONTINUED | OUTPATIENT
Start: 2022-06-13 | End: 2022-06-24

## 2022-06-13 RX ORDER — HALOPERIDOL DECANOATE 100 MG/ML
5 INJECTION INTRAMUSCULAR ONCE
Refills: 0 | Status: DISCONTINUED | OUTPATIENT
Start: 2022-06-13 | End: 2022-06-24

## 2022-06-13 RX ORDER — HALOPERIDOL DECANOATE 100 MG/ML
5 INJECTION INTRAMUSCULAR EVERY 6 HOURS
Refills: 0 | Status: DISCONTINUED | OUTPATIENT
Start: 2022-06-13 | End: 2022-06-24

## 2022-06-13 RX ORDER — LAMOTRIGINE 25 MG/1
200 TABLET, ORALLY DISINTEGRATING ORAL
Refills: 0 | Status: DISCONTINUED | OUTPATIENT
Start: 2022-06-13 | End: 2022-06-17

## 2022-06-13 RX ORDER — LAMOTRIGINE 25 MG/1
150 TABLET, ORALLY DISINTEGRATING ORAL ONCE
Refills: 0 | Status: COMPLETED | OUTPATIENT
Start: 2022-06-13 | End: 2022-06-13

## 2022-06-13 RX ORDER — MOMETASONE FUROATE AND FORMOTEROL FUMARATE DIHYDRATE 200; 5 UG/1; UG/1
2 AEROSOL RESPIRATORY (INHALATION) ONCE
Refills: 0 | Status: COMPLETED | OUTPATIENT
Start: 2022-06-13 | End: 2022-06-13

## 2022-06-13 RX ORDER — SOMATROPIN 10 MG
0.4 KIT SUBCUTANEOUS DAILY
Refills: 0 | Status: DISCONTINUED | OUTPATIENT
Start: 2022-06-13 | End: 2022-06-13

## 2022-06-13 RX ADMIN — LAMOTRIGINE 200 MILLIGRAM(S): 25 TABLET, ORALLY DISINTEGRATING ORAL at 18:10

## 2022-06-13 RX ADMIN — Medication 200 MICROGRAM(S): at 05:36

## 2022-06-13 RX ADMIN — Medication 10 MILLIGRAM(S): at 06:26

## 2022-06-13 RX ADMIN — LAMOTRIGINE 150 MILLIGRAM(S): 25 TABLET, ORALLY DISINTEGRATING ORAL at 12:04

## 2022-06-13 RX ADMIN — MOMETASONE FUROATE AND FORMOTEROL FUMARATE DIHYDRATE 2 PUFF(S): 200; 5 AEROSOL RESPIRATORY (INHALATION) at 21:46

## 2022-06-13 RX ADMIN — SERTRALINE 200 MILLIGRAM(S): 25 TABLET, FILM COATED ORAL at 08:42

## 2022-06-13 RX ADMIN — LEVETIRACETAM 500 MILLIGRAM(S): 250 TABLET, FILM COATED ORAL at 08:41

## 2022-06-13 RX ADMIN — LEVETIRACETAM 500 MILLIGRAM(S): 250 TABLET, FILM COATED ORAL at 21:46

## 2022-06-13 RX ADMIN — LAMOTRIGINE 50 MILLIGRAM(S): 25 TABLET, ORALLY DISINTEGRATING ORAL at 08:41

## 2022-06-13 RX ADMIN — Medication 5 MILLIGRAM(S): at 21:46
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
## 2022-06-13 NOTE — BH INPATIENT PSYCHIATRY PROGRESS NOTE - CURRENT MEDICATION
MEDICATIONS  (STANDING):  hydrocortisone 5 milliGRAM(s) Oral at bedtime  hydrocortisone 10 milliGRAM(s) Oral daily  lamoTRIgine 200 milliGRAM(s) Oral two times a day  levETIRAcetam 500 milliGRAM(s) Oral two times a day  levothyroxine 200 MICROGram(s) Oral <User Schedule>  levothyroxine 400 MICROGram(s) Oral <User Schedule>  LORazepam     Tablet 1 milliGRAM(s) Oral three times a day  mometasone 200 MICROgram(s)/formoterol 5 MICROgram(s) Inhaler 2 Puff(s) Inhalation two times a day  Norditropin FlexPro 10mg/1.5ml 0.4 milliGRAM(s) 0.4 milliGRAM(s) SubCutaneous daily  sertraline 200 milliGRAM(s) Oral daily  Testosterone Gel Pump 1.62% (Pt Own) 7 Pump(s) 7 Application(s) Topical daily    MEDICATIONS  (PRN):  acetaminophen     Tablet .. 650 milliGRAM(s) Oral every 6 hours PRN Mild Pain (1 - 3), Moderate Pain (4 - 6)  ALBUTerol    90 MICROgram(s) HFA Inhaler 1 Puff(s) Inhalation Once PRN Bronchospasm  diphenhydrAMINE 50 milliGRAM(s) Oral every 6 hours PRN Agitation  diphenhydrAMINE Injectable 50 milliGRAM(s) IntraMuscular once PRN Agitation  haloperidol     Tablet 5 milliGRAM(s) Oral every 6 hours PRN agitation  haloperidol    Injectable 5 milliGRAM(s) IntraMuscular once PRN agitation  LORazepam     Tablet 2 milliGRAM(s) Oral every 6 hours PRN agitation  LORazepam   Injectable 2 milliGRAM(s) IntraMuscular once PRN Agitation
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
## 2022-06-13 NOTE — BH INPATIENT PSYCHIATRY PROGRESS NOTE - NSBHCHARTREVIEWVS_PSY_A_CORE FT
Vital Signs Last 24 Hrs  T(C): 36.3 (06-13-22 @ 08:22), Max: 36.3 (06-13-22 @ 08:22)  T(F): 97.4 (06-13-22 @ 08:22), Max: 97.4 (06-13-22 @ 08:22)  HR: --  BP: --  BP(mean): --  RR: --  SpO2: --    Orthostatic VS  06-13-22 @ 08:22  Lying BP: --/-- HR: --  Sitting BP: 131/84 HR: 71  Standing BP: --/-- HR: --  Site: upper left arm  Mode: electronic

## 2022-06-13 NOTE — CONSULT NOTE ADULT - ASSESSMENT
Xray images reviewed: Minimally displaced small fragment at proximal condyle distal phalanx on right thumb.    Recommend: Splint right thumb IP joint in extension for 3-4 weeks.    F/U as an outpatient
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
## 2022-06-14 PROCEDURE — 99232 SBSQ HOSP IP/OBS MODERATE 35: CPT

## 2022-06-14 RX ORDER — SERTRALINE 25 MG/1
200 TABLET, FILM COATED ORAL AT BEDTIME
Refills: 0 | Status: DISCONTINUED | OUTPATIENT
Start: 2022-06-14 | End: 2022-06-24

## 2022-06-14 RX ORDER — RISPERIDONE 4 MG/1
0.5 TABLET ORAL AT BEDTIME
Refills: 0 | Status: DISCONTINUED | OUTPATIENT
Start: 2022-06-14 | End: 2022-06-15

## 2022-06-14 RX ADMIN — SERTRALINE 200 MILLIGRAM(S): 25 TABLET, FILM COATED ORAL at 20:58

## 2022-06-14 RX ADMIN — LEVETIRACETAM 500 MILLIGRAM(S): 250 TABLET, FILM COATED ORAL at 08:05

## 2022-06-14 RX ADMIN — Medication 1 MILLIGRAM(S): at 20:59

## 2022-06-14 RX ADMIN — Medication 200 MICROGRAM(S): at 06:26

## 2022-06-14 RX ADMIN — LEVETIRACETAM 500 MILLIGRAM(S): 250 TABLET, FILM COATED ORAL at 21:00

## 2022-06-14 RX ADMIN — LAMOTRIGINE 200 MILLIGRAM(S): 25 TABLET, ORALLY DISINTEGRATING ORAL at 08:06

## 2022-06-14 RX ADMIN — Medication 10 MILLIGRAM(S): at 06:20

## 2022-06-14 RX ADMIN — RISPERIDONE 0.5 MILLIGRAM(S): 4 TABLET ORAL at 20:59

## 2022-06-14 RX ADMIN — MOMETASONE FUROATE AND FORMOTEROL FUMARATE DIHYDRATE 2 PUFF(S): 200; 5 AEROSOL RESPIRATORY (INHALATION) at 08:06

## 2022-06-14 RX ADMIN — MOMETASONE FUROATE AND FORMOTEROL FUMARATE DIHYDRATE 2 PUFF(S): 200; 5 AEROSOL RESPIRATORY (INHALATION) at 21:01

## 2022-06-14 RX ADMIN — LAMOTRIGINE 200 MILLIGRAM(S): 25 TABLET, ORALLY DISINTEGRATING ORAL at 20:58

## 2022-06-14 RX ADMIN — Medication 5 MILLIGRAM(S): at 20:59

## 2022-06-14 NOTE — BH INPATIENT PSYCHIATRY PROGRESS NOTE - CASE SUMMARY
On encounter this morning Mr. Neff was calm and cooperative. Continues to deny and minimize the events that led to him coming to the hospital such as aggression at home, his behavior towards his son and wife, elopement attempt in the ED and irritability in the unit. Pt compliant with his medications except ativan. Substance use consult placed for tomorrow. Later in the afternoon pt was trying to open the doors to leave the unit, accusing treatment team of keeping him in the hospital against his will illegally, threatening to call a  to be released. Treatment team read patient his rights and provided him with the number for the mental hygiene . Discussed case with Dr. Shearer, patient's wife. Discussed risks/benefits of starting Depakote therapy vs an atypical antipsychotic. Discussed risks of lowering seizure threshold with all antipsychotics as well as risks of long term side effects, black box warning, risk of TD with prolonged use. Pt might benefit from antipsychotic therapy in the short term for mood stabilization and management of aggression, discussed that medication can be tapered in the community if patient doing better. Dr. Shearer verbalized agreement and also inquired about the possibility of pt taking WEBBER in the future. Will start patient on low dose risperidone at bedtime.
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
## 2022-06-14 NOTE — BH INPATIENT PSYCHIATRY PROGRESS NOTE - NSBHFUPINTERVALHXFT_PSY_A_CORE
Patient seen for follow-up of TBI. No PRNs since last evaluation.     Wife came to visit patient during visitation hours yesterday evening, reviewed Hand XR with patient's consent. She agreed with diagnosis and recommendations of [facility] Hand service, suggested patient may need longer thumb spica to stabilize thumb but is okay following up outpatient for this. Patient also facetimed son later in the evening, no reports of agitation or aggression from staff. Today, patient state that conversation with son did not go well and son refused to apologize. He continues to be upset with son and blames him for hospitalization. However, he is adamant that no further altercations will ensue if he is discharged home to family. He denies SIIP/HIIP. Understands need for CO 1:1 based on threats made to wife regarding AWOL, and agrees to remain calm and cooperative to eventually have team feel CO can be removed. The patient continues to deny any changes in mood/irritability and minimizes role in events leading to hospitalization. Notably, he is not wearing thumb splint during interview, writer encourages patient to wear at all times so thumb can heal properly.

## 2022-06-14 NOTE — BH INPATIENT PSYCHIATRY PROGRESS NOTE - NSBHMETABOLIC_PSY_ALL_CORE_FT
BMI:   HbA1c:   Glucose:   BP: 110/78 (06-12-22 @ 04:00) (110/78 - 156/78)  Lipid Panel:  BMI:   HbA1c:   Glucose:   BP: 110/78 (06-12-22 @ 04:00) (110/78 - 110/78)  Lipid Panel:

## 2022-06-14 NOTE — BH INPATIENT PSYCHIATRY PROGRESS NOTE - NSBHCHARTREVIEWVS_PSY_A_CORE FT
Vital Signs Last 24 Hrs  T(C): 36.2 (06-14-22 @ 08:22), Max: 36.2 (06-14-22 @ 08:22)  T(F): 97.1 (06-14-22 @ 08:22), Max: 97.1 (06-14-22 @ 08:22)  HR: --  BP: --  BP(mean): --  RR: --  SpO2: --    Orthostatic VS  06-14-22 @ 08:22  Lying BP: 139/92 HR: 71  Sitting BP: --/-- HR: --  Standing BP: --/-- HR: --  Site: --  Mode: -- Vital Signs Last 24 Hrs  T(C): 36.2 (06-14-22 @ 08:22), Max: 36.2 (06-14-22 @ 08:22)  T(F): 97.1 (06-14-22 @ 08:22), Max: 97.1 (06-14-22 @ 08:22)  HR: --  BP: --  BP(mean): --  RR: --  SpO2: --    Orthostatic VS  06-14-22 @ 08:22  Lying BP: 139/92 HR: 71  Sitting BP: --/-- HR: --  Standing BP: --/-- HR: --  Site: --  Mode: --  Orthostatic VS  06-13-22 @ 08:22  Lying BP: --/-- HR: --  Sitting BP: 131/84 HR: 71  Standing BP: --/-- HR: --  Site: upper left arm  Mode: electronic

## 2022-06-14 NOTE — BH SOCIAL WORK INITIAL PSYCHOSOCIAL EVALUATION - NSBHLEGALOTHER_PSY_ALL_CORE
Pt and spouse have been planning divorce for several years.  As per pt, it is moving forward and may be finalized over the next few months./Divorce
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
## 2022-06-15 LAB — TESTOST FREE+TOTAL PANEL SERPL-MCNC: 448 NG/DL — SIGNIFICANT CHANGE UP (ref 193–740)

## 2022-06-15 PROCEDURE — 99232 SBSQ HOSP IP/OBS MODERATE 35: CPT

## 2022-06-15 RX ORDER — RISPERIDONE 4 MG/1
1 TABLET ORAL AT BEDTIME
Refills: 0 | Status: DISCONTINUED | OUTPATIENT
Start: 2022-06-15 | End: 2022-06-17

## 2022-06-15 RX ADMIN — Medication 200 MICROGRAM(S): at 06:16

## 2022-06-15 RX ADMIN — LAMOTRIGINE 200 MILLIGRAM(S): 25 TABLET, ORALLY DISINTEGRATING ORAL at 09:40

## 2022-06-15 RX ADMIN — Medication 1 MILLIGRAM(S): at 12:43

## 2022-06-15 RX ADMIN — RISPERIDONE 1 MILLIGRAM(S): 4 TABLET ORAL at 20:43

## 2022-06-15 RX ADMIN — MOMETASONE FUROATE AND FORMOTEROL FUMARATE DIHYDRATE 2 PUFF(S): 200; 5 AEROSOL RESPIRATORY (INHALATION) at 09:41

## 2022-06-15 RX ADMIN — Medication 1 MILLIGRAM(S): at 20:43

## 2022-06-15 RX ADMIN — LAMOTRIGINE 200 MILLIGRAM(S): 25 TABLET, ORALLY DISINTEGRATING ORAL at 20:43

## 2022-06-15 RX ADMIN — LEVETIRACETAM 500 MILLIGRAM(S): 250 TABLET, FILM COATED ORAL at 09:40

## 2022-06-15 RX ADMIN — MOMETASONE FUROATE AND FORMOTEROL FUMARATE DIHYDRATE 2 PUFF(S): 200; 5 AEROSOL RESPIRATORY (INHALATION) at 20:45

## 2022-06-15 RX ADMIN — Medication 1 MILLIGRAM(S): at 09:40

## 2022-06-15 RX ADMIN — LEVETIRACETAM 500 MILLIGRAM(S): 250 TABLET, FILM COATED ORAL at 20:43

## 2022-06-15 RX ADMIN — SERTRALINE 200 MILLIGRAM(S): 25 TABLET, FILM COATED ORAL at 20:44

## 2022-06-15 RX ADMIN — Medication 10 MILLIGRAM(S): at 06:16

## 2022-06-15 RX ADMIN — Medication 5 MILLIGRAM(S): at 20:43

## 2022-06-15 NOTE — BH INPATIENT PSYCHIATRY PROGRESS NOTE - NSBHASSESSSUMMFT_PSY_ALL_CORE
68 yo male, PMHx of seizures, pituitary insufficiency, h/o poor impulse control and difficulty controlling anger since meningioma resection 2011 (TBI?), ongoing cannabis and alcohol use, with no prior psychiatric admission who was bib [employer] after he became aggressive with police when called by son after patient threw hot frying pan at him during an argument at home. Family report he also has been paranoid and made suicidal statements recently. Has been in treatment with a psychiatrist, on zoloft 200mg po daily with limited success.     6/13: Patient denies anger issues/irritability and is vague/guarded about events leading to hospitalization. He denies SIIP/HIIP. Impulse control has been tenuous. Patient's wife plans to send son for visitation this evening, despite encouragement from staff to avoid contact for the time being. Will f/u with Neurologist Dr. Castellanos for plan re: antiepileptic regimen.   6/14: Patient remains in denial about irritability and minimizes role in events leading to hospitalization. He made suicidal and threatening remarks to wife yesterday so placed back on CO 1:1 for SI/aggression. No episodes of agitation during visit with wife and FaceTime with son yesterday evening. VM left for Neurologist Dr. Castellanos for plan re: antiepileptic regimen.    6/15: On encounter today pt is in good spirits. Continues to minimize events leading up to his hospitalization, however has remained in good behavioral control, no psych emergencies, PRNs or restraints required. Pt has been able to tolerate in person visits from his wife and facetime calls with his son. Legal department informed treatment team that pt is being served papers for a restraining order his son placed. Treatment team communicated this to pt, he expressed being surprised by the news but remained calm and collected. Patient's family continues to advocate for his discharge and denies having safety concerns. Discharge tentative for this Friday pending pt sustaining good behavioral control.     Plan:  1) Admit to inpatient care due to aggression, paranoia, possible danger to self  2) CO 1:1 for SI/aggression  3) Legal status: 9.39  4) Psychotropic medications:  - continue sertraline 200mg po daily and Risperidone 1mg QHS.   - continue CIWA protocol (scoring 0 since admission) and ativan 1mg po tid (patient refusing). will place substance abuse consult today.  - consider depakote for aggression - will discuss with neurologist Dr. 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

## 2022-06-15 NOTE — BH INPATIENT PSYCHIATRY PROGRESS NOTE - CURRENT MEDICATION
MEDICATIONS  (STANDING):  hydrocortisone 5 milliGRAM(s) Oral at bedtime  hydrocortisone 10 milliGRAM(s) Oral daily  lamoTRIgine 200 milliGRAM(s) Oral two times a day  levETIRAcetam 500 milliGRAM(s) Oral two times a day  levothyroxine 200 MICROGram(s) Oral <User Schedule>  levothyroxine 400 MICROGram(s) Oral <User Schedule>  LORazepam     Tablet 1 milliGRAM(s) Oral three times a day  mometasone 200 MICROgram(s)/formoterol 5 MICROgram(s) Inhaler 2 Puff(s) Inhalation two times a day  Norditropin FlexPro 10mg/1.5ml 0.4 milliGRAM(s) 0.4 milliGRAM(s) SubCutaneous daily  risperiDONE   Tablet 0.5 milliGRAM(s) Oral at bedtime  sertraline 200 milliGRAM(s) Oral at bedtime  Testosterone Gel Pump 1.62% (Pt Own) 7 Pump(s) 7 Application(s) Topical daily    MEDICATIONS  (PRN):  acetaminophen     Tablet .. 650 milliGRAM(s) Oral every 6 hours PRN Mild Pain (1 - 3), Moderate Pain (4 - 6)  ALBUTerol    90 MICROgram(s) HFA Inhaler 1 Puff(s) Inhalation Once PRN Bronchospasm  diphenhydrAMINE 50 milliGRAM(s) Oral every 6 hours PRN Agitation  diphenhydrAMINE Injectable 50 milliGRAM(s) IntraMuscular once PRN Agitation  haloperidol     Tablet 5 milliGRAM(s) Oral every 6 hours PRN agitation  haloperidol    Injectable 5 milliGRAM(s) IntraMuscular once PRN agitation  LORazepam     Tablet 2 milliGRAM(s) Oral every 6 hours PRN agitation  LORazepam   Injectable 2 milliGRAM(s) IntraMuscular once PRN Agitation

## 2022-06-15 NOTE — BH INPATIENT PSYCHIATRY PROGRESS NOTE - NSBHCHARTREVIEWVS_PSY_A_CORE FT
Vital Signs Last 24 Hrs  T(C): 36.9 (06-15-22 @ 07:39), Max: 36.9 (06-15-22 @ 07:39)  T(F): 98.5 (06-15-22 @ 07:39), Max: 98.5 (06-15-22 @ 07:39)  HR: --  BP: --  BP(mean): --  RR: --  SpO2: --    Orthostatic VS  06-15-22 @ 07:39  Lying BP: --/-- HR: --  Sitting BP: 121/92 HR: 86  Standing BP: --/-- HR: --  Site: --  Mode: --  Orthostatic VS  06-14-22 @ 08:22  Lying BP: 139/92 HR: 71  Sitting BP: --/-- HR: --  Standing BP: --/-- HR: --  Site: --  Mode: --

## 2022-06-15 NOTE — BH INPATIENT PSYCHIATRY PROGRESS NOTE - NSBHFUPINTERVALHXFT_PSY_A_CORE
Patient seen for follow-up of TBI. No PRNs since last evaluation.     On encounter today pt is in good spirits. Continues to minimize events leading up to his hospitalization, however has remained in good behavioral control, no psych emergencies, PRNs or restraints required. Pt has been able to tolerate in person visits from his wife and facetime calls with his son. Legal department informed treatment team that pt is being served papers for a restraining order his son placed. Treatment team communicated this to pt, he expressed being surprised by the news but remained calm and collected. Patient's wife continues to advocate for his discharge and denies having safety concerns. Discharge tentative for this Friday pending pt sustaining good behavioral control.

## 2022-06-16 PROCEDURE — 99232 SBSQ HOSP IP/OBS MODERATE 35: CPT

## 2022-06-16 RX ADMIN — LEVETIRACETAM 500 MILLIGRAM(S): 250 TABLET, FILM COATED ORAL at 09:59

## 2022-06-16 RX ADMIN — Medication 200 MICROGRAM(S): at 06:55

## 2022-06-16 RX ADMIN — Medication 1 MILLIGRAM(S): at 10:00

## 2022-06-16 RX ADMIN — MOMETASONE FUROATE AND FORMOTEROL FUMARATE DIHYDRATE 2 PUFF(S): 200; 5 AEROSOL RESPIRATORY (INHALATION) at 21:20

## 2022-06-16 RX ADMIN — LAMOTRIGINE 200 MILLIGRAM(S): 25 TABLET, ORALLY DISINTEGRATING ORAL at 20:32

## 2022-06-16 RX ADMIN — Medication 1 MILLIGRAM(S): at 20:33

## 2022-06-16 RX ADMIN — LEVETIRACETAM 500 MILLIGRAM(S): 250 TABLET, FILM COATED ORAL at 20:33

## 2022-06-16 RX ADMIN — Medication 5 MILLIGRAM(S): at 20:32

## 2022-06-16 RX ADMIN — LAMOTRIGINE 200 MILLIGRAM(S): 25 TABLET, ORALLY DISINTEGRATING ORAL at 09:59

## 2022-06-16 RX ADMIN — Medication 1 MILLIGRAM(S): at 14:08

## 2022-06-16 RX ADMIN — Medication 10 MILLIGRAM(S): at 06:55

## 2022-06-16 RX ADMIN — MOMETASONE FUROATE AND FORMOTEROL FUMARATE DIHYDRATE 2 PUFF(S): 200; 5 AEROSOL RESPIRATORY (INHALATION) at 10:15

## 2022-06-16 RX ADMIN — SERTRALINE 200 MILLIGRAM(S): 25 TABLET, FILM COATED ORAL at 20:33

## 2022-06-16 NOTE — MEDICAL LEGAL COORDINATOR PROGRESS NOTE - COMMENTS
I served patient with a "Temporary Order of Protection" from [facility]. Patient has a in person appearance on June 17, 2022 at 9am. Patient family insisted on a family meeting on Friday morning June 17, 2022. Patient does not want the hearing adjourned although he will not be there. Writer spoke with  on Call who informed me that the hospital was served with papers and its hospital responsibility to notify the court.

## 2022-06-16 NOTE — BH INPATIENT PSYCHIATRY PROGRESS NOTE - NSBHASSESSSUMMFT_PSY_ALL_CORE
68 yo male, PMHx of seizures, pituitary insufficiency, h/o poor impulse control and difficulty controlling anger since meningioma resection 2011 (TBI?), ongoing cannabis and alcohol use, with no prior psychiatric admission who was bib [employer] after he became aggressive with police when called by son after patient threw hot frying pan at him during an argument at home. Family report he also has been paranoid and made suicidal statements recently. Has been in treatment with a psychiatrist, on zoloft 200mg po daily with limited success.     6/13: Patient denies anger issues/irritability and is vague/guarded about events leading to hospitalization. He denies SIIP/HIIP. Impulse control has been tenuous. Patient's wife plans to send son for visitation this evening, despite encouragement from staff to avoid contact for the time being. Will f/u with Neurologist Dr. Castellanos for plan re: antiepileptic regimen.   6/14: Patient remains in denial about irritability and minimizes role in events leading to hospitalization. He made suicidal and threatening remarks to wife yesterday so placed back on CO 1:1 for SI/aggression. No episodes of agitation during visit with wife and FaceTime with son yesterday evening. VM left for Neurologist Dr. Castellanos for plan re: antiepileptic regimen.    6/15: On encounter today pt is in good spirits. Continues to minimize events leading up to his hospitalization, however has remained in good behavioral control, no psych emergencies, PRNs or restraints required. Pt has been able to tolerate in person visits from his wife and facetime calls with his son. Legal department informed treatment team that pt is being served papers for a restraining order his son placed. Treatment team communicated this to pt, he expressed being surprised by the news but remained calm and collected. Patient's family continues to advocate for his discharge and denies having safety concerns. Discharge tentative for this Friday pending pt sustaining good behavioral control.   6/16: Patient is reportedly due in tomorrow court for OOP son has filed against him, has been advised by  to have case adjourned until later date. He continues to minimize and/or deny role in events leading to admission. He also adamantly denies alcohol abuse. Will continue with titration of Risperdal.     Plan:  1) Admit to inpatient care due to aggression, paranoia, possible danger to self  2) CO 1:1 for SI/aggression  3) Legal status: 9.39  4) Psychotropic medications:  - continue sertraline 200mg po daily and Risperidone 1mg QHS.   - continue CIWA protocol (scoring 0 since admission) and ativan 1mg po tid (patient refusing). substance abuse consult placed.  - consider depakote for aggression - will discuss with neurologist Dr. 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

## 2022-06-16 NOTE — BH INPATIENT PSYCHIATRY PROGRESS NOTE - CURRENT MEDICATION
MEDICATIONS  (STANDING):  hydrocortisone 5 milliGRAM(s) Oral at bedtime  hydrocortisone 10 milliGRAM(s) Oral daily  lamoTRIgine 200 milliGRAM(s) Oral two times a day  levETIRAcetam 500 milliGRAM(s) Oral two times a day  levothyroxine 200 MICROGram(s) Oral <User Schedule>  levothyroxine 400 MICROGram(s) Oral <User Schedule>  LORazepam     Tablet 1 milliGRAM(s) Oral three times a day  mometasone 200 MICROgram(s)/formoterol 5 MICROgram(s) Inhaler 2 Puff(s) Inhalation two times a day  Norditropin FlexPro 10mg/1.5ml 0.4 milliGRAM(s) 0.4 milliGRAM(s) SubCutaneous daily  risperiDONE   Tablet 1 milliGRAM(s) Oral at bedtime  sertraline 200 milliGRAM(s) Oral at bedtime  Testosterone Gel Pump 1.62% (Pt Own) 7 Pump(s) 7 Application(s) Topical daily    MEDICATIONS  (PRN):  acetaminophen     Tablet .. 650 milliGRAM(s) Oral every 6 hours PRN Mild Pain (1 - 3), Moderate Pain (4 - 6)  ALBUTerol    90 MICROgram(s) HFA Inhaler 1 Puff(s) Inhalation Once PRN Bronchospasm  diphenhydrAMINE 50 milliGRAM(s) Oral every 6 hours PRN Agitation  diphenhydrAMINE Injectable 50 milliGRAM(s) IntraMuscular once PRN Agitation  haloperidol     Tablet 5 milliGRAM(s) Oral every 6 hours PRN agitation  haloperidol    Injectable 5 milliGRAM(s) IntraMuscular once PRN agitation  LORazepam     Tablet 2 milliGRAM(s) Oral every 6 hours PRN agitation  LORazepam   Injectable 2 milliGRAM(s) IntraMuscular once PRN Agitation

## 2022-06-16 NOTE — BH INPATIENT PSYCHIATRY PROGRESS NOTE - NSBHFUPINTERVALHXFT_PSY_A_CORE
Patient seen for follow-up of TBI. No PRNs since last evaluation.     On encounter today, patient is frustrated that he will be unable to leave today to prepare to court tomorrow. Patient is due in court for OOP son has filed against him. He continues to minimize anger/violence leading to hospitalization, cannot give coherent explanation for why son would file OOP. He states that his wife and son want him to be discharged. He adamantly denies alcohol abuse and states only using marijuana twice weekly, he is "insulted" by substance abuse consult. Notably, patient has remained in behavioral control since admission, requiring no PRNs.

## 2022-06-16 NOTE — BH INPATIENT PSYCHIATRY PROGRESS NOTE - NSBHCHARTREVIEWVS_PSY_A_CORE FT
Vital Signs Last 24 Hrs  T(C): 37.1 (06-16-22 @ 08:03), Max: 37.1 (06-16-22 @ 08:03)  T(F): 98.7 (06-16-22 @ 08:03), Max: 98.7 (06-16-22 @ 08:03)  HR: --  BP: --  BP(mean): --  RR: --  SpO2: --    Orthostatic VS  06-16-22 @ 08:03  Lying BP: --/-- HR: --  Sitting BP: 127/88 HR: 92  Standing BP: --/-- HR: --  Site: upper left arm  Mode: electronic   Vital Signs Last 24 Hrs  T(C): 37.1 (06-16-22 @ 08:03), Max: 37.1 (06-16-22 @ 08:03)  T(F): 98.7 (06-16-22 @ 08:03), Max: 98.7 (06-16-22 @ 08:03)  HR: --  BP: --  BP(mean): --  RR: --  SpO2: --    Orthostatic VS  06-16-22 @ 08:03  Lying BP: --/-- HR: --  Sitting BP: 127/88 HR: 92  Standing BP: --/-- HR: --  Site: upper left arm  Mode: electronic  Orthostatic VS  06-15-22 @ 07:39  Lying BP: --/-- HR: --  Sitting BP: 121/92 HR: 86  Standing BP: --/-- HR: --  Site: --  Mode: --

## 2022-06-16 NOTE — BH INPATIENT PSYCHIATRY PROGRESS NOTE - CASE SUMMARY
On encounter this morning Mr. Neff was calm. As per staff he has remained on good behavioral control and has had no episodes of aggression or behavioral outbursts. Pt continues to minimize events leading up to his admission, showing limited insight into his past behavior. For instance patient's wife has reported his aggressive behavior worsens when he drinks, pt denies ever drinking "more than 1 drink". Treatment team discussed how, especially after his radiation therapy and surgery, alcohol may affect him differently, however pt continues to insist alcohol and cannabis are not a problem. He remains open to meeting with the substance use consult team.  This morning patient's wife Dr. Shearer left a  requesting treatment team schedule a family meeting prior to patient being discharged in order to discuss the family conflicts and next steps. Treatment team offered to have out legal team assist in adjourning patient's court date tomorrow morning at 9am for his son's order of protection, since family insisted in having a family meeting. Family declined offer to adjourn court date. Treatment team offered to schedule a family meeting with patient's wife and patient's son present. Pt's son is visiting [facility] as well. Of note, treatment team spoke with patient's son Nelson over the phone today. Nelson expressed safety concerns and advocated for patient to remain in the hospital longer in order to continue treatment and observation, considering recent escalating aggression at home. Patient's wife on the other hand is advocating for release. Will follow up on patient's behavior and tolerability when son visit's tonight and also during tomorrow's family meeting at noon. On encounter this morning Mr. Neff was calm. As per staff he has remained on good behavioral control and has had no episodes of aggression or behavioral outbursts. Pt continues to minimize events leading up to his admission, showing limited insight into his past behavior. For instance patient's wife has reported his aggressive behavior worsens when he drinks, pt denies ever drinking "more than 1 drink". Treatment team discussed how, especially after his radiation therapy and surgery, alcohol may affect him differently, however pt continues to insist alcohol and cannabis are not a problem. He remains open to meeting with the substance use consult team.  This morning patient's wife Dr. Shearer left a  requesting treatment team schedule a family meeting prior to patient being discharged in order to discuss the family conflicts and next steps. Treatment team offered to have out legal team assist in adjourning patient's court date tomorrow morning at 9am for his son's order of protection, since family insisted in having a family meeting. Family declined offer to adjourn court date. Treatment team offered to schedule a family meeting with patient's wife and patient's son present. [facility] Of note, treatment team spoke with patient's son Nelson over the phone today. Nelson expressed safety concerns and advocated for patient to remain in the hospital longer in order to continue treatment and observation, considering recent escalating aggression at home. Patient's wife on the other hand is advocating for release. Will follow up on patient's behavior and tolerability when son visit's tonight and also during tomorrow's family meeting at noon. Patient has been accepting PO Risperidone and pver the past few days has not had any behavioral outbursts, episodes of agitation or PRNs. However at this time treatment team is hearing conflicting statements from patient's wife and son. Both wife and son report patient has been escalating in terms of irritability and aggression over the past few months. Wife has reported s that pt primary problem at current is pt is very prone to rage, frightening and dangerous. Spouse reports this has been over the last 10 years but worse recently "going on all the time". This led to patient's son visiting and the events that led up to patient's hospitalization. It appears that patient's aggression is mostly directed at his wife and son and may not be manifested in other contexts such as the hospital setting. Taking this into account, plus patient's son's concerns for safety, a safe discharge plan cannot be set until treatment team is able to observe patient's behavior with his son during visiting and during family meeting.

## 2022-06-17 LAB
LAMOTRIGINE SERPL-MCNC: 3.1 UG/ML — SIGNIFICANT CHANGE UP (ref 2–20)
TESTOST FREE SERPL-MCNC: 5 PG/ML — LOW (ref 5.9–27)

## 2022-06-17 PROCEDURE — 99232 SBSQ HOSP IP/OBS MODERATE 35: CPT

## 2022-06-17 RX ORDER — SERTRALINE 25 MG/1
2 TABLET, FILM COATED ORAL
Qty: 28 | Refills: 0
Start: 2022-06-17 | End: 2022-06-30

## 2022-06-17 RX ORDER — LAMOTRIGINE 25 MG/1
1 TABLET, ORALLY DISINTEGRATING ORAL
Qty: 0 | Refills: 0 | DISCHARGE

## 2022-06-17 RX ORDER — RISPERIDONE 4 MG/1
1 TABLET ORAL
Qty: 14 | Refills: 0
Start: 2022-06-17 | End: 2022-06-30

## 2022-06-17 RX ORDER — SERTRALINE 25 MG/1
2 TABLET, FILM COATED ORAL
Qty: 0 | Refills: 0 | DISCHARGE

## 2022-06-17 RX ORDER — RISPERIDONE 4 MG/1
2 TABLET ORAL AT BEDTIME
Refills: 0 | Status: DISCONTINUED | OUTPATIENT
Start: 2022-06-17 | End: 2022-06-21

## 2022-06-17 RX ORDER — LACOSAMIDE 50 MG/1
50 TABLET ORAL
Refills: 0 | Status: DISCONTINUED | OUTPATIENT
Start: 2022-06-17 | End: 2022-06-24

## 2022-06-17 RX ORDER — ALBUTEROL 90 UG/1
1 AEROSOL, METERED ORAL
Qty: 0 | Refills: 0 | DISCHARGE
Start: 2022-06-17

## 2022-06-17 RX ADMIN — LEVETIRACETAM 500 MILLIGRAM(S): 250 TABLET, FILM COATED ORAL at 22:07

## 2022-06-17 RX ADMIN — LEVETIRACETAM 500 MILLIGRAM(S): 250 TABLET, FILM COATED ORAL at 08:50

## 2022-06-17 RX ADMIN — SERTRALINE 200 MILLIGRAM(S): 25 TABLET, FILM COATED ORAL at 22:08

## 2022-06-17 RX ADMIN — RISPERIDONE 2 MILLIGRAM(S): 4 TABLET ORAL at 22:07

## 2022-06-17 RX ADMIN — Medication 1 MILLIGRAM(S): at 22:07

## 2022-06-17 RX ADMIN — Medication 10 MILLIGRAM(S): at 06:53

## 2022-06-17 RX ADMIN — LAMOTRIGINE 200 MILLIGRAM(S): 25 TABLET, ORALLY DISINTEGRATING ORAL at 08:49

## 2022-06-17 RX ADMIN — MOMETASONE FUROATE AND FORMOTEROL FUMARATE DIHYDRATE 2 PUFF(S): 200; 5 AEROSOL RESPIRATORY (INHALATION) at 22:08

## 2022-06-17 RX ADMIN — MOMETASONE FUROATE AND FORMOTEROL FUMARATE DIHYDRATE 2 PUFF(S): 200; 5 AEROSOL RESPIRATORY (INHALATION) at 08:50

## 2022-06-17 RX ADMIN — Medication 5 MILLIGRAM(S): at 22:08

## 2022-06-17 RX ADMIN — LACOSAMIDE 50 MILLIGRAM(S): 50 TABLET ORAL at 22:07

## 2022-06-17 RX ADMIN — Medication 200 MICROGRAM(S): at 06:53
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
## 2022-06-17 NOTE — BH INPATIENT PSYCHIATRY PROGRESS NOTE - CURRENT MEDICATION
MEDICATIONS  (STANDING):  hydrocortisone 5 milliGRAM(s) Oral at bedtime  hydrocortisone 10 milliGRAM(s) Oral daily  levETIRAcetam 500 milliGRAM(s) Oral two times a day  levothyroxine 200 MICROGram(s) Oral <User Schedule>  levothyroxine 400 MICROGram(s) Oral <User Schedule>  LORazepam     Tablet 1 milliGRAM(s) Oral three times a day  mometasone 200 MICROgram(s)/formoterol 5 MICROgram(s) Inhaler 2 Puff(s) Inhalation two times a day  Norditropin FlexPro 10mg/1.5ml 0.4 milliGRAM(s) 0.4 milliGRAM(s) SubCutaneous daily  risperiDONE   Tablet 1 milliGRAM(s) Oral at bedtime  sertraline 200 milliGRAM(s) Oral at bedtime  Testosterone Gel Pump 1.62% (Pt Own) 7 Pump(s) 7 Application(s) Topical daily    MEDICATIONS  (PRN):  acetaminophen     Tablet .. 650 milliGRAM(s) Oral every 6 hours PRN Mild Pain (1 - 3), Moderate Pain (4 - 6)  ALBUTerol    90 MICROgram(s) HFA Inhaler 1 Puff(s) Inhalation Once PRN Bronchospasm  diphenhydrAMINE 50 milliGRAM(s) Oral every 6 hours PRN Agitation  diphenhydrAMINE Injectable 50 milliGRAM(s) IntraMuscular once PRN Agitation  haloperidol     Tablet 5 milliGRAM(s) Oral every 6 hours PRN agitation  haloperidol    Injectable 5 milliGRAM(s) IntraMuscular once PRN agitation  LORazepam     Tablet 2 milliGRAM(s) Oral every 6 hours PRN agitation  LORazepam   Injectable 2 milliGRAM(s) IntraMuscular once PRN Agitation   MEDICATIONS  (STANDING):  hydrocortisone 5 milliGRAM(s) Oral at bedtime  hydrocortisone 10 milliGRAM(s) Oral daily  levETIRAcetam 500 milliGRAM(s) Oral two times a day  levothyroxine 200 MICROGram(s) Oral <User Schedule>  levothyroxine 400 MICROGram(s) Oral <User Schedule>  LORazepam     Tablet 1 milliGRAM(s) Oral three times a day  mometasone 200 MICROgram(s)/formoterol 5 MICROgram(s) Inhaler 2 Puff(s) Inhalation two times a day  Norditropin FlexPro 10mg/1.5ml 0.4 milliGRAM(s) 0.4 milliGRAM(s) SubCutaneous daily  risperiDONE   Tablet 2 milliGRAM(s) Oral at bedtime  sertraline 200 milliGRAM(s) Oral at bedtime  Testosterone Gel Pump 1.62% (Pt Own) 7 Pump(s) 7 Application(s) Topical daily    MEDICATIONS  (PRN):  acetaminophen     Tablet .. 650 milliGRAM(s) Oral every 6 hours PRN Mild Pain (1 - 3), Moderate Pain (4 - 6)  ALBUTerol    90 MICROgram(s) HFA Inhaler 1 Puff(s) Inhalation Once PRN Bronchospasm  diphenhydrAMINE 50 milliGRAM(s) Oral every 6 hours PRN Agitation  diphenhydrAMINE Injectable 50 milliGRAM(s) IntraMuscular once PRN Agitation  haloperidol     Tablet 5 milliGRAM(s) Oral every 6 hours PRN agitation  haloperidol    Injectable 5 milliGRAM(s) IntraMuscular once PRN agitation  LORazepam     Tablet 2 milliGRAM(s) Oral every 6 hours PRN agitation  LORazepam   Injectable 2 milliGRAM(s) IntraMuscular once PRN Agitation   MEDICATIONS  (STANDING):  hydrocortisone 5 milliGRAM(s) Oral at bedtime  hydrocortisone 10 milliGRAM(s) Oral daily  lacosamide 50 milliGRAM(s) Oral two times a day  levETIRAcetam 500 milliGRAM(s) Oral two times a day  levothyroxine 200 MICROGram(s) Oral <User Schedule>  levothyroxine 400 MICROGram(s) Oral <User Schedule>  LORazepam     Tablet 1 milliGRAM(s) Oral three times a day  mometasone 200 MICROgram(s)/formoterol 5 MICROgram(s) Inhaler 2 Puff(s) Inhalation two times a day  Norditropin FlexPro 10mg/1.5ml 0.4 milliGRAM(s) 0.4 milliGRAM(s) SubCutaneous daily  risperiDONE   Tablet 2 milliGRAM(s) Oral at bedtime  sertraline 200 milliGRAM(s) Oral at bedtime  Testosterone Gel Pump 1.62% (Pt Own) 7 Pump(s) 7 Application(s) Topical daily    MEDICATIONS  (PRN):  acetaminophen     Tablet .. 650 milliGRAM(s) Oral every 6 hours PRN Mild Pain (1 - 3), Moderate Pain (4 - 6)  ALBUTerol    90 MICROgram(s) HFA Inhaler 1 Puff(s) Inhalation Once PRN Bronchospasm  diphenhydrAMINE 50 milliGRAM(s) Oral every 6 hours PRN Agitation  diphenhydrAMINE Injectable 50 milliGRAM(s) IntraMuscular once PRN Agitation  haloperidol     Tablet 5 milliGRAM(s) Oral every 6 hours PRN agitation  haloperidol    Injectable 5 milliGRAM(s) IntraMuscular once PRN agitation  LORazepam     Tablet 2 milliGRAM(s) Oral every 6 hours PRN agitation  LORazepam   Injectable 2 milliGRAM(s) IntraMuscular once PRN Agitation

## 2022-06-17 NOTE — BH INPATIENT PSYCHIATRY PROGRESS NOTE - NSBHCHARTREVIEWVS_PSY_A_CORE FT
Vital Signs Last 24 Hrs  T(C): 36.7 (06-17-22 @ 08:03), Max: 36.7 (06-17-22 @ 08:03)  T(F): 98 (06-17-22 @ 08:03), Max: 98 (06-17-22 @ 08:03)  HR: --  BP: --  BP(mean): --  RR: --  SpO2: --    Orthostatic VS  06-17-22 @ 08:03  Lying BP: --/-- HR: --  Sitting BP: 145/86 HR: 83  Standing BP: 140/86 HR: 91  Site: upper left arm  Mode: electronic Vital Signs Last 24 Hrs  T(C): 36.7 (06-17-22 @ 08:03), Max: 36.7 (06-17-22 @ 08:03)  T(F): 98 (06-17-22 @ 08:03), Max: 98 (06-17-22 @ 08:03)  HR: --  BP: --  BP(mean): --  RR: --  SpO2: --    Orthostatic VS  06-17-22 @ 08:03  Lying BP: --/-- HR: --  Sitting BP: 145/86 HR: 83  Standing BP: 140/86 HR: 91  Site: upper left arm  Mode: electronic  Orthostatic VS  06-16-22 @ 08:03  Lying BP: --/-- HR: --  Sitting BP: 127/88 HR: 92  Standing BP: --/-- HR: --  Site: upper left arm  Mode: electronic
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
## 2022-06-17 NOTE — BH INPATIENT PSYCHIATRY PROGRESS NOTE - CASE SUMMARY
During family meeting patient's son revealed to treatment team that patient has been calling him all week and threatening him over the phone, accusing him of putting him in the hospital. During the meeting patient's family confronted patient with his recent anger outbursts, physical and verbal aggression. Patient's son confronted pt with his behavior when the police came to their home last weekend (as per family pt was verbally threatening, belligerent and at one point tried to drive away). Patient shows poor insight when confronted with this, able to recognize and admit events did take place, however does not admit to having done anything wrong. At one point pt is able to verbalize he acted a certain way "because I was angry", however is unable to elaborate on this thought or extrapolate to other instances in which his anger may have gotten out of control and he may have acted in ways he later regrets. Taking into account patient's lack of insight, reports of verbal threats to his son throughout the whole week and new med changes (discontinuation of Lamictal after case discussion with patient's Neurologist, titration of Risperidone) agreed with family that it would be best to hold off on discharging patient until next week in order to continue treatment and arrangement of a safe discharge with aftercare. Pt was upset when treatment team confirmed he would not be leaving today. He was loud and accusatory, unable to engage in a conversation about his care and his recent behavior. Will continue titrating Risperidone and observe off lamictal.  During family meeting patient's son revealed to treatment team that patient has been calling him all week and threatening him over the phone, accusing him of putting him in the hospital. During the meeting patient's family confronted patient with his recent anger outbursts, physical and verbal aggression. Patient's son confronted pt with his behavior when the police came to their home last weekend (as per family pt was verbally threatening, belligerent and at one point tried to drive away). Patient shows poor insight when confronted with this, able to recognize and admit events did take place, however does not admit to having done anything wrong. At one point pt is able to verbalize he acted a certain way "because I was angry", however is unable to elaborate on this thought or extrapolate to other instances in which his anger may have gotten out of control and he may have acted in ways he later regrets. Taking into account patient's lack of insight, reports of verbal threats to his son throughout the whole week and new med changes (discontinuation of Lamictal after case discussion with patient's Neurologist, titration of Risperidone) agreed with family that it would be best to hold off on discharging patient until next week in order to continue treatment and arrangement of a safe discharge with aftercare. Pt was upset when treatment team confirmed he would not be leaving today. He was loud and accusatory, unable to engage in a conversation about his care and his recent behavior. Will continue titrating Risperidone and observe off lamictal  Case discussed again with Neurologist Dr. Castellanos. Asides from discontinuing Lamictal he recommended starting Vimpat 50mg BID in the hospital with goal of eventually tapering pt off Keppra as it has been associated with psychiatric sx and worsening irritability.

## 2022-06-17 NOTE — BH SAFETY PLAN - WARNING SIGN 1
Patient declined the safety plan. However, patient initially presented with symptoms of agitation and aggressive behavior. Patient stated he would complete the Safety Plan at home.

## 2022-06-17 NOTE — BH INPATIENT PSYCHIATRY PROGRESS NOTE - NSBHFUPINTERVALHXFT_PSY_A_CORE
Patient seen for follow-up of TBI. No PRNs since last evaluation.     Patient's son visited yesterday evening, patient remained calm and reports meeting went well. He explains that son's OOP is specifically for when patient is under the influence of drugs or alcohol. He reports that he submitted a letter to the court stating that he will not oppose the order. Patient continues to minimize emotional dysregulation, but eventually admits that he becomes frustrated with his wife when they discuss finances. He feels that wife should help him more financially. The patient refused Risperdal dose overnight, he reports today that medication was referred to as antipsychotic by nurse, which made him hesitant to accept. Team provided education about indications for Risperdal and patient agrees to take moving forward. Patient continues to remain in behavioral control.    Patient will be seen by substance abuse team this AM. Family meeting scheduled for 12 pm. Will consider discharge today pending family meeting and outpatient follow-up (patient needs new outpatient provider).  Patient seen for follow-up of TBI. No PRNs since last evaluation.     Patient's son visited yesterday evening, patient remained calm and reports meeting went well. He explains that son's OOP is specifically for when patient is under the influence of drugs or alcohol. He reports that he submitted a letter to the court stating that he will not oppose the order. Patient continues to minimize emotional dysregulation, but eventually admits that he becomes frustrated with his wife when they discuss finances. He feels that wife should help him more financially. The patient refused Risperdal dose overnight, he reports today that medication was referred to as antipsychotic by nurse, which made him hesitant to accept. Team provided education about indications for Risperdal and patient agrees to take moving forward. Patient continues to remain in behavioral control.    Patient will be seen by substance abuse team this AM. Family meeting scheduled for 12 pm. Will consider discharge today pending family meeting and outpatient follow-up (patient needs new outpatient provider).     Update: During family meeting patient's son revealed to treatment team that patient has been calling him all week and threatening him over the phone, accusing him of putting him in the hospital. During the meeting patient's family confronted patient with his recent anger outbursts, physical and verbal aggression. Patient's son confronted pt with his behavior when the police came to their home last weekend (as per family pt was verbally threatening, belligerent and at one point tried to drive away). Patient shows poor insight when confronted with this, able to recognize and admit events did take place, however does not admit to having done anything wrong. At one point pt is able to verbalize he acted a certain way "because I was angry", however is unable to elaborate on this thought or extrapolate to other instances in which his anger may have gotten out of control and he may have acted in ways he later regrets. Taking into account patient's lack of insight, reports of verbal threats to his son throughout the whole week and new med changes (discontinuation of Lamictal after case discussion with patient's Neurologist, titration of Risperidone) agreed with family that it would be best to hold off on discharging patient until next week in order to continue treatment and arrangement of a safe discharge with aftercare.

## 2022-06-18 PROCEDURE — 99231 SBSQ HOSP IP/OBS SF/LOW 25: CPT

## 2022-06-18 RX ADMIN — Medication 1 MILLIGRAM(S): at 22:02

## 2022-06-18 RX ADMIN — SERTRALINE 200 MILLIGRAM(S): 25 TABLET, FILM COATED ORAL at 21:52

## 2022-06-18 RX ADMIN — LEVETIRACETAM 500 MILLIGRAM(S): 250 TABLET, FILM COATED ORAL at 21:54

## 2022-06-18 RX ADMIN — LACOSAMIDE 50 MILLIGRAM(S): 50 TABLET ORAL at 21:52

## 2022-06-18 RX ADMIN — LACOSAMIDE 50 MILLIGRAM(S): 50 TABLET ORAL at 10:58

## 2022-06-18 RX ADMIN — Medication 400 MICROGRAM(S): at 06:21

## 2022-06-18 RX ADMIN — LEVETIRACETAM 500 MILLIGRAM(S): 250 TABLET, FILM COATED ORAL at 10:58

## 2022-06-18 RX ADMIN — RISPERIDONE 2 MILLIGRAM(S): 4 TABLET ORAL at 22:12

## 2022-06-18 RX ADMIN — Medication 10 MILLIGRAM(S): at 06:21

## 2022-06-18 RX ADMIN — MOMETASONE FUROATE AND FORMOTEROL FUMARATE DIHYDRATE 2 PUFF(S): 200; 5 AEROSOL RESPIRATORY (INHALATION) at 22:13

## 2022-06-18 RX ADMIN — MOMETASONE FUROATE AND FORMOTEROL FUMARATE DIHYDRATE 2 PUFF(S): 200; 5 AEROSOL RESPIRATORY (INHALATION) at 10:57

## 2022-06-18 RX ADMIN — Medication 5 MILLIGRAM(S): at 21:51

## 2022-06-18 NOTE — BH INPATIENT PSYCHIATRY PROGRESS NOTE - NSBHCHARTREVIEWVS_PSY_A_CORE FT
Vital Signs Last 24 Hrs  T(C): 36.7 (06-18-22 @ 08:09), Max: 36.7 (06-18-22 @ 08:09)  T(F): 98.1 (06-18-22 @ 08:09), Max: 98.1 (06-18-22 @ 08:09)  HR: --  BP: --  BP(mean): --  RR: --  SpO2: --    Orthostatic VS  06-18-22 @ 08:09  Lying BP: --/-- HR: --  Sitting BP: 129/77 HR: 95  Standing BP: --/-- HR: --  Site: --  Mode: --  Orthostatic VS  06-17-22 @ 08:03  Lying BP: --/-- HR: --  Sitting BP: 145/86 HR: 83  Standing BP: 140/86 HR: 91  Site: upper left arm  Mode: electronic

## 2022-06-18 NOTE — BH INPATIENT PSYCHIATRY PROGRESS NOTE - CURRENT MEDICATION
MEDICATIONS  (STANDING):  hydrocortisone 5 milliGRAM(s) Oral at bedtime  hydrocortisone 10 milliGRAM(s) Oral daily  lacosamide 50 milliGRAM(s) Oral two times a day  levETIRAcetam 500 milliGRAM(s) Oral two times a day  levothyroxine 200 MICROGram(s) Oral <User Schedule>  levothyroxine 400 MICROGram(s) Oral <User Schedule>  LORazepam     Tablet 1 milliGRAM(s) Oral three times a day  mometasone 200 MICROgram(s)/formoterol 5 MICROgram(s) Inhaler 2 Puff(s) Inhalation two times a day  Norditropin FlexPro 10mg/1.5ml 0.4 milliGRAM(s) 0.4 milliGRAM(s) SubCutaneous daily  risperiDONE   Tablet 2 milliGRAM(s) Oral at bedtime  sertraline 200 milliGRAM(s) Oral at bedtime  Testosterone Gel Pump 1.62% (Pt Own) 7 Pump(s) 7 Application(s) Topical daily    MEDICATIONS  (PRN):  acetaminophen     Tablet .. 650 milliGRAM(s) Oral every 6 hours PRN Mild Pain (1 - 3), Moderate Pain (4 - 6)  ALBUTerol    90 MICROgram(s) HFA Inhaler 1 Puff(s) Inhalation Once PRN Bronchospasm  diphenhydrAMINE 50 milliGRAM(s) Oral every 6 hours PRN Agitation  diphenhydrAMINE Injectable 50 milliGRAM(s) IntraMuscular once PRN Agitation  haloperidol     Tablet 5 milliGRAM(s) Oral every 6 hours PRN agitation  haloperidol    Injectable 5 milliGRAM(s) IntraMuscular once PRN agitation  LORazepam     Tablet 2 milliGRAM(s) Oral every 6 hours PRN agitation  LORazepam   Injectable 2 milliGRAM(s) IntraMuscular once PRN Agitation
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
## 2022-06-19 PROCEDURE — 99231 SBSQ HOSP IP/OBS SF/LOW 25: CPT

## 2022-06-19 RX ADMIN — MOMETASONE FUROATE AND FORMOTEROL FUMARATE DIHYDRATE 2 PUFF(S): 200; 5 AEROSOL RESPIRATORY (INHALATION) at 21:51

## 2022-06-19 RX ADMIN — LACOSAMIDE 50 MILLIGRAM(S): 50 TABLET ORAL at 09:37

## 2022-06-19 RX ADMIN — Medication 1 MILLIGRAM(S): at 09:37

## 2022-06-19 RX ADMIN — LEVETIRACETAM 500 MILLIGRAM(S): 250 TABLET, FILM COATED ORAL at 09:37

## 2022-06-19 RX ADMIN — LEVETIRACETAM 500 MILLIGRAM(S): 250 TABLET, FILM COATED ORAL at 21:36

## 2022-06-19 RX ADMIN — Medication 1 MILLIGRAM(S): at 21:22

## 2022-06-19 RX ADMIN — Medication 1 MILLIGRAM(S): at 12:59

## 2022-06-19 RX ADMIN — LACOSAMIDE 50 MILLIGRAM(S): 50 TABLET ORAL at 21:21

## 2022-06-19 RX ADMIN — Medication 5 MILLIGRAM(S): at 21:21

## 2022-06-19 RX ADMIN — Medication 10 MILLIGRAM(S): at 06:16

## 2022-06-19 RX ADMIN — MOMETASONE FUROATE AND FORMOTEROL FUMARATE DIHYDRATE 2 PUFF(S): 200; 5 AEROSOL RESPIRATORY (INHALATION) at 09:57

## 2022-06-19 RX ADMIN — Medication 400 MICROGRAM(S): at 06:16

## 2022-06-19 RX ADMIN — SERTRALINE 200 MILLIGRAM(S): 25 TABLET, FILM COATED ORAL at 21:22

## 2022-06-19 RX ADMIN — RISPERIDONE 2 MILLIGRAM(S): 4 TABLET ORAL at 21:22

## 2022-06-19 NOTE — BH INPATIENT PSYCHIATRY PROGRESS NOTE - NSBHCHARTREVIEWVS_PSY_A_CORE FT
Vital Signs Last 24 Hrs  T(C): 36.3 (06-19-22 @ 07:03), Max: 36.3 (06-19-22 @ 07:03)  T(F): 97.3 (06-19-22 @ 07:03), Max: 97.3 (06-19-22 @ 07:03)  HR: --  BP: --  BP(mean): --  RR: --  SpO2: --    Orthostatic VS  06-19-22 @ 07:03  Lying BP: --/-- HR: --  Sitting BP: 130/93 HR: 86  Standing BP: --/-- HR: --  Site: --  Mode: --  Orthostatic VS  06-18-22 @ 08:09  Lying BP: --/-- HR: --  Sitting BP: 129/77 HR: 95  Standing BP: --/-- HR: --  Site: --  Mode: --
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
## 2022-06-20 PROCEDURE — 99232 SBSQ HOSP IP/OBS MODERATE 35: CPT

## 2022-06-20 RX ADMIN — MOMETASONE FUROATE AND FORMOTEROL FUMARATE DIHYDRATE 2 PUFF(S): 200; 5 AEROSOL RESPIRATORY (INHALATION) at 09:52

## 2022-06-20 RX ADMIN — SERTRALINE 200 MILLIGRAM(S): 25 TABLET, FILM COATED ORAL at 20:34

## 2022-06-20 RX ADMIN — Medication 200 MICROGRAM(S): at 06:32

## 2022-06-20 RX ADMIN — LEVETIRACETAM 500 MILLIGRAM(S): 250 TABLET, FILM COATED ORAL at 20:34

## 2022-06-20 RX ADMIN — Medication 10 MILLIGRAM(S): at 06:32

## 2022-06-20 RX ADMIN — RISPERIDONE 2 MILLIGRAM(S): 4 TABLET ORAL at 20:34

## 2022-06-20 RX ADMIN — Medication 5 MILLIGRAM(S): at 20:34

## 2022-06-20 RX ADMIN — LACOSAMIDE 50 MILLIGRAM(S): 50 TABLET ORAL at 09:53

## 2022-06-20 RX ADMIN — LACOSAMIDE 50 MILLIGRAM(S): 50 TABLET ORAL at 20:34

## 2022-06-20 RX ADMIN — LEVETIRACETAM 500 MILLIGRAM(S): 250 TABLET, FILM COATED ORAL at 09:53

## 2022-06-20 RX ADMIN — MOMETASONE FUROATE AND FORMOTEROL FUMARATE DIHYDRATE 2 PUFF(S): 200; 5 AEROSOL RESPIRATORY (INHALATION) at 20:35

## 2022-06-20 NOTE — BH INPATIENT PSYCHIATRY PROGRESS NOTE - NSBHCHARTREVIEWVS_PSY_A_CORE FT
Vital Signs Last 24 Hrs  T(C): 37 (06-20-22 @ 07:59), Max: 37 (06-20-22 @ 07:59)  T(F): 98.6 (06-20-22 @ 07:59), Max: 98.6 (06-20-22 @ 07:59)  HR: --  BP: --  BP(mean): --  RR: --  SpO2: --    Orthostatic VS  06-20-22 @ 07:59  Lying BP: --/-- HR: --  Sitting BP: 131/85 HR: 95  Standing BP: --/-- HR: --  Site: upper left arm  Mode: electronic   Vital Signs Last 24 Hrs  T(C): 37 (06-20-22 @ 07:59), Max: 37 (06-20-22 @ 07:59)  T(F): 98.6 (06-20-22 @ 07:59), Max: 98.6 (06-20-22 @ 07:59)  HR: --  BP: --  BP(mean): --  RR: --  SpO2: --    Orthostatic VS  06-20-22 @ 07:59  Lying BP: --/-- HR: --  Sitting BP: 131/85 HR: 95  Standing BP: --/-- HR: --  Site: upper left arm  Mode: electronic  Orthostatic VS  06-19-22 @ 07:03  Lying BP: --/-- HR: --  Sitting BP: 130/93 HR: 86  Standing BP: --/-- HR: --  Site: --  Mode: --
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
## 2022-06-20 NOTE — BH INPATIENT PSYCHIATRY PROGRESS NOTE - NSBHFUPINTERVALHXFT_PSY_A_CORE
Patient seen for follow-up of TBI. No PRNs since last evaluation.     Patient reports doing well today. He denies any issues with patients or staff over the weekend. He is frustrated with remaining hospitalized on father's day and missing medical appts. He has been compliant with most medications (refusing Ativan), has not noticed any side effects. He reports that his wife and son are in [place], he believes his wife is returning tomorrow. Discussed seizure medications and patient states that he will return to Dr. Castellanos (he previously stated he would not follow-up with him) since Dr. Castellanos knows him well. He is agreeable to medication changes.

## 2022-06-20 NOTE — BH INPATIENT PSYCHIATRY PROGRESS NOTE - CURRENT MEDICATION
MEDICATIONS  (STANDING):  hydrocortisone 10 milliGRAM(s) Oral daily  hydrocortisone 5 milliGRAM(s) Oral at bedtime  lacosamide 50 milliGRAM(s) Oral two times a day  levETIRAcetam 500 milliGRAM(s) Oral two times a day  levothyroxine 200 MICROGram(s) Oral <User Schedule>  levothyroxine 400 MICROGram(s) Oral <User Schedule>  LORazepam     Tablet 1 milliGRAM(s) Oral three times a day  mometasone 200 MICROgram(s)/formoterol 5 MICROgram(s) Inhaler 2 Puff(s) Inhalation two times a day  Norditropin FlexPro 10mg/1.5ml 0.4 milliGRAM(s) 0.4 milliGRAM(s) SubCutaneous daily  risperiDONE   Tablet 2 milliGRAM(s) Oral at bedtime  sertraline 200 milliGRAM(s) Oral at bedtime  Testosterone Gel Pump 1.62% (Pt Own) 7 Pump(s) 7 Application(s) Topical daily    MEDICATIONS  (PRN):  acetaminophen     Tablet .. 650 milliGRAM(s) Oral every 6 hours PRN Mild Pain (1 - 3), Moderate Pain (4 - 6)  ALBUTerol    90 MICROgram(s) HFA Inhaler 1 Puff(s) Inhalation Once PRN Bronchospasm  diphenhydrAMINE 50 milliGRAM(s) Oral every 6 hours PRN Agitation  diphenhydrAMINE Injectable 50 milliGRAM(s) IntraMuscular once PRN Agitation  haloperidol     Tablet 5 milliGRAM(s) Oral every 6 hours PRN agitation  haloperidol    Injectable 5 milliGRAM(s) IntraMuscular once PRN agitation  LORazepam     Tablet 2 milliGRAM(s) Oral every 6 hours PRN agitation  LORazepam   Injectable 2 milliGRAM(s) IntraMuscular once PRN Agitation   MEDICATIONS  (STANDING):  hydrocortisone 10 milliGRAM(s) Oral daily  hydrocortisone 5 milliGRAM(s) Oral at bedtime  lacosamide 50 milliGRAM(s) Oral two times a day  levETIRAcetam 500 milliGRAM(s) Oral two times a day  levothyroxine 200 MICROGram(s) Oral <User Schedule>  levothyroxine 400 MICROGram(s) Oral <User Schedule>  mometasone 200 MICROgram(s)/formoterol 5 MICROgram(s) Inhaler 2 Puff(s) Inhalation two times a day  Norditropin FlexPro 10mg/1.5ml 0.4 milliGRAM(s) 0.4 milliGRAM(s) SubCutaneous daily  risperiDONE   Tablet 2 milliGRAM(s) Oral at bedtime  sertraline 200 milliGRAM(s) Oral at bedtime  Testosterone Gel Pump 1.62% (Pt Own) 7 Pump(s) 7 Application(s) Topical daily    MEDICATIONS  (PRN):  acetaminophen     Tablet .. 650 milliGRAM(s) Oral every 6 hours PRN Mild Pain (1 - 3), Moderate Pain (4 - 6)  ALBUTerol    90 MICROgram(s) HFA Inhaler 1 Puff(s) Inhalation Once PRN Bronchospasm  diphenhydrAMINE 50 milliGRAM(s) Oral every 6 hours PRN Agitation  diphenhydrAMINE Injectable 50 milliGRAM(s) IntraMuscular once PRN Agitation  haloperidol     Tablet 5 milliGRAM(s) Oral every 6 hours PRN agitation  haloperidol    Injectable 5 milliGRAM(s) IntraMuscular once PRN agitation  LORazepam     Tablet 2 milliGRAM(s) Oral every 6 hours PRN agitation  LORazepam   Injectable 2 milliGRAM(s) IntraMuscular once PRN Agitation

## 2022-06-21 PROCEDURE — 99232 SBSQ HOSP IP/OBS MODERATE 35: CPT

## 2022-06-21 RX ORDER — POLYETHYLENE GLYCOL 3350 17 G/17G
17 POWDER, FOR SOLUTION ORAL ONCE
Refills: 0 | Status: COMPLETED | OUTPATIENT
Start: 2022-06-21 | End: 2022-06-21

## 2022-06-21 RX ORDER — POLYETHYLENE GLYCOL 3350 17 G/17G
17 POWDER, FOR SOLUTION ORAL DAILY
Refills: 0 | Status: DISCONTINUED | OUTPATIENT
Start: 2022-06-21 | End: 2022-06-22

## 2022-06-21 RX ORDER — RISPERIDONE 4 MG/1
3 TABLET ORAL AT BEDTIME
Refills: 0 | Status: DISCONTINUED | OUTPATIENT
Start: 2022-06-21 | End: 2022-06-22

## 2022-06-21 RX ADMIN — SERTRALINE 200 MILLIGRAM(S): 25 TABLET, FILM COATED ORAL at 22:02

## 2022-06-21 RX ADMIN — MOMETASONE FUROATE AND FORMOTEROL FUMARATE DIHYDRATE 2 PUFF(S): 200; 5 AEROSOL RESPIRATORY (INHALATION) at 10:57

## 2022-06-21 RX ADMIN — POLYETHYLENE GLYCOL 3350 17 GRAM(S): 17 POWDER, FOR SOLUTION ORAL at 14:58

## 2022-06-21 RX ADMIN — Medication 200 MICROGRAM(S): at 06:38

## 2022-06-21 RX ADMIN — LEVETIRACETAM 500 MILLIGRAM(S): 250 TABLET, FILM COATED ORAL at 10:57

## 2022-06-21 RX ADMIN — LEVETIRACETAM 500 MILLIGRAM(S): 250 TABLET, FILM COATED ORAL at 22:03

## 2022-06-21 RX ADMIN — LACOSAMIDE 50 MILLIGRAM(S): 50 TABLET ORAL at 10:58

## 2022-06-21 RX ADMIN — Medication 5 MILLIGRAM(S): at 22:01

## 2022-06-21 RX ADMIN — MOMETASONE FUROATE AND FORMOTEROL FUMARATE DIHYDRATE 2 PUFF(S): 200; 5 AEROSOL RESPIRATORY (INHALATION) at 21:49

## 2022-06-21 RX ADMIN — LACOSAMIDE 50 MILLIGRAM(S): 50 TABLET ORAL at 22:01

## 2022-06-21 RX ADMIN — Medication 10 MILLIGRAM(S): at 06:39

## 2022-06-21 RX ADMIN — RISPERIDONE 3 MILLIGRAM(S): 4 TABLET ORAL at 22:02

## 2022-06-21 NOTE — BH INPATIENT PSYCHIATRY PROGRESS NOTE - CASE SUMMARY
Pt expressed distress this morning at his ongoing hospitalization, claiming he has done nothing wrong and has complied with "everything you've asked me to do", however he is still in the hospital. Pt expressed concerns about taking antipsychotic medications. Writer provided psychoeducation on the uses of antipsychotics and discussed the reasoning behind using Risperidone now to target his recent anger outbursts and irritability. Pt verbalized agreement.

## 2022-06-21 NOTE — BH INPATIENT PSYCHIATRY PROGRESS NOTE - CURRENT MEDICATION
MEDICATIONS  (STANDING):  hydrocortisone 5 milliGRAM(s) Oral at bedtime  hydrocortisone 10 milliGRAM(s) Oral daily  lacosamide 50 milliGRAM(s) Oral two times a day  levETIRAcetam 500 milliGRAM(s) Oral two times a day  levothyroxine 200 MICROGram(s) Oral <User Schedule>  levothyroxine 400 MICROGram(s) Oral <User Schedule>  mometasone 200 MICROgram(s)/formoterol 5 MICROgram(s) Inhaler 2 Puff(s) Inhalation two times a day  Norditropin FlexPro 10mg/1.5ml 0.4 milliGRAM(s) 0.4 milliGRAM(s) SubCutaneous daily  risperiDONE   Tablet 3 milliGRAM(s) Oral at bedtime  sertraline 200 milliGRAM(s) Oral at bedtime  Testosterone Gel Pump 1.62% (Pt Own) 7 Pump(s) 7 Application(s) Topical daily    MEDICATIONS  (PRN):  acetaminophen     Tablet .. 650 milliGRAM(s) Oral every 6 hours PRN Mild Pain (1 - 3), Moderate Pain (4 - 6)  ALBUTerol    90 MICROgram(s) HFA Inhaler 1 Puff(s) Inhalation Once PRN Bronchospasm  diphenhydrAMINE 50 milliGRAM(s) Oral every 6 hours PRN Agitation  diphenhydrAMINE Injectable 50 milliGRAM(s) IntraMuscular once PRN Agitation  haloperidol     Tablet 5 milliGRAM(s) Oral every 6 hours PRN agitation  haloperidol    Injectable 5 milliGRAM(s) IntraMuscular once PRN agitation  LORazepam     Tablet 2 milliGRAM(s) Oral every 6 hours PRN agitation  LORazepam   Injectable 2 milliGRAM(s) IntraMuscular once PRN Agitation

## 2022-06-21 NOTE — BH INPATIENT PSYCHIATRY PROGRESS NOTE - NSBHCHARTREVIEWVS_PSY_A_CORE FT
Vital Signs Last 24 Hrs  T(C): 36.8 (06-21-22 @ 08:47), Max: 36.8 (06-21-22 @ 08:47)  T(F): 98.2 (06-21-22 @ 08:47), Max: 98.2 (06-21-22 @ 08:47)  HR: --  BP: --  BP(mean): --  RR: --  SpO2: --    Orthostatic VS  06-21-22 @ 08:47  Lying BP: --/-- HR: --  Sitting BP: 122/98 HR: 82  Standing BP: 109/91 HR: 89  Site: --  Mode: -- Vital Signs Last 24 Hrs  T(C): 36.8 (06-21-22 @ 08:47), Max: 36.8 (06-21-22 @ 08:47)  T(F): 98.2 (06-21-22 @ 08:47), Max: 98.2 (06-21-22 @ 08:47)  HR: --  BP: --  BP(mean): --  RR: --  SpO2: --    Orthostatic VS  06-21-22 @ 08:47  Lying BP: --/-- HR: --  Sitting BP: 122/98 HR: 82  Standing BP: 109/91 HR: 89  Site: --  Mode: --  Orthostatic VS  06-20-22 @ 07:59  Lying BP: --/-- HR: --  Sitting BP: 131/85 HR: 95  Standing BP: --/-- HR: --  Site: upper left arm  Mode: electronic

## 2022-06-21 NOTE — BH INPATIENT PSYCHIATRY PROGRESS NOTE - NSBHATTESTSEENBY_PSY_A_CORE
attending Psychiatrist without NP/Trainee
Attending Psychiatrist supervising NP/Trainee, meeting pt...
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
## 2022-06-21 NOTE — BH INPATIENT PSYCHIATRY PROGRESS NOTE - NSBHFUPINTERVALHXFT_PSY_A_CORE
Patient seen for follow-up of TBI. No PRNs since last evaluation.     Patient expresses frustration with remaining hospitalized. He feels that he has been compliant with treatment and therefore should be discharged. He is uncomfortable with taking antipsychotic medication and continues to deny any issues with mood or irritability. He denies any verbal or physical abuse directed towards his family. He becomes irritable when writer discusses trip to [place] as reported by family. Writer attempts to provide education about safe discharge but patient does not express any understanding.

## 2022-06-21 NOTE — BH INPATIENT PSYCHIATRY PROGRESS NOTE - NSBHINPTBILLING_PSY_ALL_CORE
72962 - Inpatient Low Complexity
76416 - Inpatient Moderate Complexity
55625 - Inpatient Low Complexity
28635 - Inpatient Moderate Complexity
60368 - Inpatient Moderate Complexity
55180 - Inpatient Moderate Complexity
92859 - Inpatient Moderate Complexity
77392 - Inpatient Moderate Complexity
50217 - Inpatient Moderate Complexity

## 2022-06-22 PROCEDURE — 99232 SBSQ HOSP IP/OBS MODERATE 35: CPT

## 2022-06-22 RX ORDER — RISPERIDONE 4 MG/1
3 TABLET ORAL AT BEDTIME
Refills: 0 | Status: DISCONTINUED | OUTPATIENT
Start: 2022-06-22 | End: 2022-06-24

## 2022-06-22 RX ORDER — POLYETHYLENE GLYCOL 3350 17 G/17G
17 POWDER, FOR SOLUTION ORAL DAILY
Refills: 0 | Status: DISCONTINUED | OUTPATIENT
Start: 2022-06-22 | End: 2022-06-24

## 2022-06-22 RX ADMIN — Medication 5 MILLIGRAM(S): at 21:45

## 2022-06-22 RX ADMIN — Medication 200 MICROGRAM(S): at 06:34

## 2022-06-22 RX ADMIN — MOMETASONE FUROATE AND FORMOTEROL FUMARATE DIHYDRATE 2 PUFF(S): 200; 5 AEROSOL RESPIRATORY (INHALATION) at 21:45

## 2022-06-22 RX ADMIN — Medication 10 MILLIGRAM(S): at 06:34

## 2022-06-22 RX ADMIN — LACOSAMIDE 50 MILLIGRAM(S): 50 TABLET ORAL at 09:10

## 2022-06-22 RX ADMIN — RISPERIDONE 3 MILLIGRAM(S): 4 TABLET ORAL at 21:45

## 2022-06-22 RX ADMIN — LEVETIRACETAM 500 MILLIGRAM(S): 250 TABLET, FILM COATED ORAL at 09:10

## 2022-06-22 RX ADMIN — LACOSAMIDE 50 MILLIGRAM(S): 50 TABLET ORAL at 21:44

## 2022-06-22 RX ADMIN — LEVETIRACETAM 500 MILLIGRAM(S): 250 TABLET, FILM COATED ORAL at 21:54

## 2022-06-22 RX ADMIN — MOMETASONE FUROATE AND FORMOTEROL FUMARATE DIHYDRATE 2 PUFF(S): 200; 5 AEROSOL RESPIRATORY (INHALATION) at 09:10

## 2022-06-22 RX ADMIN — SERTRALINE 200 MILLIGRAM(S): 25 TABLET, FILM COATED ORAL at 21:45

## 2022-06-22 NOTE — BH INPATIENT PSYCHIATRY PROGRESS NOTE - CURRENT MEDICATION
MEDICATIONS  (STANDING):  hydrocortisone 5 milliGRAM(s) Oral at bedtime  hydrocortisone 10 milliGRAM(s) Oral daily  lacosamide 50 milliGRAM(s) Oral two times a day  levETIRAcetam 500 milliGRAM(s) Oral two times a day  levothyroxine 200 MICROGram(s) Oral <User Schedule>  levothyroxine 400 MICROGram(s) Oral <User Schedule>  mometasone 200 MICROgram(s)/formoterol 5 MICROgram(s) Inhaler 2 Puff(s) Inhalation two times a day  Norditropin FlexPro 10mg/1.5ml 0.4 milliGRAM(s) 0.4 milliGRAM(s) SubCutaneous daily  risperiDONE  Disintegrating Tablet 3 milliGRAM(s) Oral at bedtime  sertraline 200 milliGRAM(s) Oral at bedtime  Testosterone Gel Pump 1.62% (Pt Own) 7 Pump(s) 7 Application(s) Topical daily    MEDICATIONS  (PRN):  acetaminophen     Tablet .. 650 milliGRAM(s) Oral every 6 hours PRN Mild Pain (1 - 3), Moderate Pain (4 - 6)  ALBUTerol    90 MICROgram(s) HFA Inhaler 1 Puff(s) Inhalation Once PRN Bronchospasm  diphenhydrAMINE 50 milliGRAM(s) Oral every 6 hours PRN Agitation  diphenhydrAMINE Injectable 50 milliGRAM(s) IntraMuscular once PRN Agitation  haloperidol     Tablet 5 milliGRAM(s) Oral every 6 hours PRN agitation  haloperidol    Injectable 5 milliGRAM(s) IntraMuscular once PRN agitation  LORazepam     Tablet 2 milliGRAM(s) Oral every 6 hours PRN agitation  LORazepam   Injectable 2 milliGRAM(s) IntraMuscular once PRN Agitation  polyethylene glycol 3350 17 Gram(s) Oral daily PRN constipation

## 2022-06-22 NOTE — ED BEHAVIORAL HEALTH ASSESSMENT NOTE - NS ED BHA PLAN REASON FOR OVERRIDING OBJECTION
Catheter removal documentation on 6/22/2022:    Billy Santana presents to the clinic for catheter removal.  Reason for removal: TOV  Order has been verified. Dr. Vaughan  Catheter successfully removed at 10:40 AM without immediate complication.  100 cc's of urine present in the catheter bag.  Urethral meatus is free of secretions and encrustation.  The patient is afebrile.  The patient tolerated the procedure and was instructed to return or call for pain, fever, leakage or decreased urine flow.    Patient requesting Viagra medication sent to pharmacy.    TOV: patient voided 150 mL of urine, sent home with instructions to call clinic if unable to void or having any concerns regarding urination.     Alexandrea Castro RN     Acuity of patient symptomatology warrants immediate transfer to inpatient care
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
## 2022-06-22 NOTE — BH INPATIENT PSYCHIATRY PROGRESS NOTE - NSBHCHARTREVIEWVS_PSY_A_CORE FT
Vital Signs Last 24 Hrs  T(C): 36.9 (06-22-22 @ 07:59), Max: 36.9 (06-22-22 @ 07:59)  T(F): 98.5 (06-22-22 @ 07:59), Max: 98.5 (06-22-22 @ 07:59)  HR: --  BP: --  BP(mean): --  RR: --  SpO2: --    Orthostatic VS  06-22-22 @ 07:59  Lying BP: --/-- HR: --  Sitting BP: 119/84 HR: 91  Standing BP: --/-- HR: --  Site: upper left arm  Mode: electronic Vital Signs Last 24 Hrs  T(C): 36.9 (06-22-22 @ 07:59), Max: 36.9 (06-22-22 @ 07:59)  T(F): 98.5 (06-22-22 @ 07:59), Max: 98.5 (06-22-22 @ 07:59)  HR: --  BP: --  BP(mean): --  RR: --  SpO2: --    Orthostatic VS  06-22-22 @ 07:59  Lying BP: --/-- HR: --  Sitting BP: 119/84 HR: 91  Standing BP: --/-- HR: --  Site: upper left arm  Mode: electronic  Orthostatic VS  06-21-22 @ 08:47  Lying BP: --/-- HR: --  Sitting BP: 122/98 HR: 82  Standing BP: 109/91 HR: 89  Site: --  Mode: --

## 2022-06-22 NOTE — BH INPATIENT PSYCHIATRY PROGRESS NOTE - NSBHFUPINTERVALHXFT_PSY_A_CORE
Patient seen for follow-up of TBI. No PRNs since last evaluation.     Patient continues to express frustration with hospitalization and dissatisfaction with taking an antipsychotic. He repeats statements made over previous 2 days despite education from various team members. He acknowledges tension with his wife, particularly around finances, which makes him angry. He agrees to comply with team's recommendations regarding follow-up appts.

## 2022-06-23 DIAGNOSIS — Z98.890 OTHER SPECIFIED POSTPROCEDURAL STATES: Chronic | ICD-10-CM

## 2022-06-23 PROCEDURE — 99231 SBSQ HOSP IP/OBS SF/LOW 25: CPT

## 2022-06-23 PROCEDURE — 99238 HOSP IP/OBS DSCHRG MGMT 30/<: CPT

## 2022-06-23 RX ORDER — RISPERIDONE 4 MG/1
1 TABLET ORAL
Qty: 14 | Refills: 0
Start: 2022-06-23 | End: 2022-07-06

## 2022-06-23 RX ORDER — LACOSAMIDE 50 MG/1
1 TABLET ORAL
Qty: 28 | Refills: 0
Start: 2022-06-23 | End: 2022-07-06

## 2022-06-23 RX ADMIN — MOMETASONE FUROATE AND FORMOTEROL FUMARATE DIHYDRATE 2 PUFF(S): 200; 5 AEROSOL RESPIRATORY (INHALATION) at 21:34

## 2022-06-23 RX ADMIN — MOMETASONE FUROATE AND FORMOTEROL FUMARATE DIHYDRATE 2 PUFF(S): 200; 5 AEROSOL RESPIRATORY (INHALATION) at 09:47

## 2022-06-23 RX ADMIN — LEVETIRACETAM 500 MILLIGRAM(S): 250 TABLET, FILM COATED ORAL at 21:30

## 2022-06-23 RX ADMIN — Medication 10 MILLIGRAM(S): at 06:20

## 2022-06-23 RX ADMIN — Medication 200 MICROGRAM(S): at 06:20

## 2022-06-23 RX ADMIN — LACOSAMIDE 50 MILLIGRAM(S): 50 TABLET ORAL at 09:34

## 2022-06-23 RX ADMIN — SERTRALINE 200 MILLIGRAM(S): 25 TABLET, FILM COATED ORAL at 21:31

## 2022-06-23 RX ADMIN — LACOSAMIDE 50 MILLIGRAM(S): 50 TABLET ORAL at 21:31

## 2022-06-23 RX ADMIN — Medication 5 MILLIGRAM(S): at 21:31

## 2022-06-23 RX ADMIN — RISPERIDONE 3 MILLIGRAM(S): 4 TABLET ORAL at 21:31

## 2022-06-23 RX ADMIN — LEVETIRACETAM 500 MILLIGRAM(S): 250 TABLET, FILM COATED ORAL at 09:34

## 2022-06-23 NOTE — BH INPATIENT PSYCHIATRY DISCHARGE NOTE - NSDCMRMEDTOKEN_GEN_ALL_CORE_FT
albuterol 90 mcg/inh inhalation aerosol: 1 puff(s) inhaled once, As needed, Bronchospasm  Dulera 200 mcg-5 mcg/inh inhalation aerosol: 2 puff(s) inhaled 2 times a day  hydrocortisone 10 mg oral tablet: 1 tab(s) orally once a day (in the morning)  hydrocortisone 5 mg oral tablet: 1 tab(s) orally once a day (at bedtime)  Keppra 500 mg oral tablet: 1 tab(s) orally 2 times a day  lacosamide 50 mg oral tablet: 1 tab(s) orally 2 times a day MDD:100mg  levothyroxine 200 mcg (0.2 mg) oral capsule: 2 tab(s) orally Saturday and   levothyroxine 200 mcg (0.2 mg) oral tablet: 1 tab(s) orally Monday through Friday  Norditropin FlexPro Pen 10 mg/1.5 mL subcutaneous solution: 0.4 unit(s) subcutaneous once a day  risperiDONE 3 mg oral tablet, disintegratin tab(s) orally once a day (at bedtime)  testosterone topical: Apply topically to affected area once a day  Zoloft 100 mg oral tablet: 2 tab(s) orally once a day (at bedtime)

## 2022-06-23 NOTE — BH DISCHARGE NOTE NURSING/SOCIAL WORK/PSYCH REHAB - NSDCPRGOAL_PSY_ALL_CORE
Patient initially presented with symptoms of paranoia, anxiety, suicidal ideations, agitation and aggressive behavior. Patient's initial goals included increasing patient's anger management skills, decreasing anxiety, decreasing agitated and aggressive behavior. Patient made gains towards his rehab goals as evidenced via patient's brighter affect, increased frustration tolerance, non-aggressive behavior and denial of suicidal ideations. Patient also demonstrated daily medication compliance and participation in rehab groups.     Patient completed a self-rating, but stated he would mail back the [employer] survey.

## 2022-06-23 NOTE — BH INPATIENT PSYCHIATRY DISCHARGE NOTE - NSBHDCHANDOFFFT_PSY_ALL_CORE
Handoff faxed to [facility] including discussion of hospital course, treatment, and medications. The patient’s appointment is on 6/28/22 at 9:15am.  Handoff faxed to [facility] including discussion of hospital course, treatment, and medications. The patient’s appointment is on 6/28/22 at 9:15am. Once an intake provider is identified, writer will call [phone] to provide verbal handoff.
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
## 2022-06-23 NOTE — BH INPATIENT PSYCHIATRY DISCHARGE NOTE - HOSPITAL COURSE
While in [facility] ED, patient attempted to elope and was stopped by staff in ED parking lot. On arrival to [facility], psych emergency called for acute agitation as patient was upset about missing medications. The patient was able to be de-escalated by staff and accepted Ativan 2mg PO. Afterwards, patient complaining of R thumb pain 2/2 injury sustained during ED elopement. Patient transferred back to ED for R hand XR, which showed fracture of distal phalanx on R thumb. Hand surgery service recommended splint R thumb IP joint in extension for 3-4 weeks and outpatient follow-up. Patient transferred back to [facility] with thumb splint.     During admission, patient minimized role in events leading to hospitalization and denied episodes of aggression or irritability despite contradicting account from family. The patient’s wife, who is an orthopedic surgeon, and son were very involved in patient’s treatment. Per wife’s report patient made statements suggesting he would get himself discharged from hospital “by any mean’s necessary” including harming staff and engaging in suicidal behavior. Therefore, patient was placed on CO 1:1 for SI and aggression potential. The patient denied SIIP/HIIP and remained in behavioral control so CO 1:1 was discontinued after approx. 24 hours.     Notably, the patient’s son filed an order of protection against the patient to limit contact while patient is under influence of any substances. Afterwards, a family meeting was held with the patient’s wife and son on 06/17/22. During the meeting, it was revealed that patient had been making threatening statements to family over the phone throughout the week. The patient remained in behavioral control during the meeting, but he was visibly upset and agitated throughout. Family acknowledges that this is an improvement but remained worried that patient would use substances immediately upon discharged and physical & verbal abuse would resume. It was decided that the patient would remain hospitalized after this meeting for continued observation of his behavior while medications were titrated.     The patient remained in behavioral control, did not display any irritability or aggression, and did not require any PRN medications for remainder of hospitalization. Risperdal was titrated to 3mg qHS to treat emotional dysregulation and irritability. The patient was made aware of the risks and benefits of the medication and tolerated it well with no apparent side effects. The patient’s outpatient neurologist, Dr. Castellanos, suggested that Lamictal (for patient’s seizure d/o) could be contributing to mood lability, so this was discontinued and Vimpat 50mg BID was started.     The patient was evaluated by substance abuse consult service, and though he minimizes substance use (in contradiction to family report), he agreed to follow-up with dual diagnosis clinic. He is scheduled for an intake with [facility] on 06/28. On the day of discharge, he acknowledges that there is an issue that needs to be addressed and he is agreeable to continue treatment as an outpatient.    On the day of discharge, the patient’s mood and affect are normal/euthymic. Patient denies depressed mood and anxiety. Patient is not hopeless. Sleep and appetite are also normal (at baseline).  Pt’s motor performance and productivity of speech are WNL. Pt denies symptoms of psychosis including AH/VH with no apparent delusions.  Patient denies S/H/I/I/P.    Patient has made clinically meaningful progress during this hospitalization and has benefited from medications and psychotherapy. On day of discharge, the patient has improved and no longer requires inpatient treatment and care. Patient will be discharged and will follow-up with outpatient care.      Patient was provided with a 14-day supply of medications, extensive psychoeducation on treatment options and motivational counseling targeting healthy lifestyle. Patient was instructed on actions for crisis situations, understood and agreed to follow instructions for handling crisis, including coming to ER or calling 911 should the patient or their family feel that they are in danger of hurting self or others. Patient also was given Suicide Prevention Lifeline number 1-317.566.6104 and provided with instructions on its use.     Patient will be discharged with the following DSM5 Diagnoses: Unspecified mood disorder    Patient will be discharged on the following medications: Risperdal 3mg qHS

## 2022-06-23 NOTE — BH INPATIENT PSYCHIATRY DISCHARGE NOTE - LEGAL HISTORY
Ophthalmology visit this year  Plan for imaging in 2025  Physical therapy: [phone] call for recommendation of place to go  Follow up with Dr. Brooks in 1 year  Call neurosurgery team at [phone] with any questions or concerns   Non eknown

## 2022-06-23 NOTE — BH DISCHARGE NOTE NURSING/SOCIAL WORK/PSYCH REHAB - DISCHARGE INSTRUCTIONS AFTERCARE APPOINTMENTS
In order to check the location, date, or time of your aftercare appointment, please refer to your Discharge Instructions Document given to you upon leaving the hospital.  If you have lost the instructions please call [phone]

## 2022-06-23 NOTE — BH INPATIENT PSYCHIATRY DISCHARGE NOTE - NSBHDCMEDICALFT_PSY_A_CORE
The patient sustained an injury in [facility] ED resulting in R thumb fracture. He was placed in R thumb splint and advised to follow-up as an outpatient in 3-4 weeks.

## 2022-06-23 NOTE — BH INPATIENT PSYCHIATRY DISCHARGE NOTE - HPI (INCLUDE ILLNESS QUALITY, SEVERITY, DURATION, TIMING, CONTEXT, MODIFYING FACTORS, ASSOCIATED SIGNS AND SYMPTOMS)
68 yo male with a h/o TBI s/p meningioma resection 2011, recent onset seizures, living with wife, in the process of a divorce, admitted on 9.39 after being bib EMS and [employer] activated by son due to agitation and aggression. Per family reports in ED notes, patient threw a hot greasy frying pan at his son during an argument. When police arrived, patient became "aggressive with " per spouse as noted in MSW note. Of note, patient eloped from the ED. On assessment, patient denies having thrown a pan at his son but that he was making breakfast and tossed the soriano in his direction. He states he's going through a divorce and that his son feels the need to "protect" patient's wife from him and has made false allegations to police to get him away from his wife. In regards to allegations he made in the ED about his wife stealing from him, he did continue to endorse this today. He also denies having made any suicidal statements as reported to ED staff by spouse. His reports of EtOH consumption and cannabis use is less than that reported by spouse. Writer did call patient's wife for collateral information but she stated she did not have a lot of time to go over events and medication list again so collateral obtained by MSW in ED is copied and pasted below. She did state that he's not known to have any cognitive deficits, that he takes his own medications and should know his names and doses. Writer informed her patient denies knowing doses after which she stated she would call the unit tomorrow with more information.     Per MSW note on 6/10/22:  [Pt lives with spouse. Pt retired on disability formerly an . Spouse reports mental health hx starting 10 yrs ago after meningioma with removal and radiation with pituitary insufficiency. Spouse reports that pt primary problem at current is pt is very prone to rage, frightening and dangerous. Spouse reports this has been over the last 10 years but worse recently "going on all the time". Spouse says the last couple months. Spouse reports that pt becoming angered more. Spouse mentions pt having a first seizure episode x 9 months ago placed on medication.   Pt prescribed Adderall last 8-9 years and taken off recently. Pt blames spouse for provider stopping medication. Spouse mentions her having reported pt's "rage".  Spouse reports hospital given list of current medication reporting "its all there".     Pt recently has been gone for awhile reporting pt "just leaves". Spouse reports that this time pt left Saturday and went tot [place]. Spouse says pt has done this 3-4x in the last month. Spouse received text from him that he was buying a house in [place] and was just going to vanish. Spouse reports that they are in process of going through divorce due to ongoing issues. Adult son who lives in [place] also in town due to concerns with pt. Spouse and son have been staying in hotels to avoid pt. Pt then today was home and family home. Spouse tending to something on phone while pt was saying he wanted a hair dryer. Pt then was said to have a "full on lunatic fit" becoming belligerent with yelling, screaming, and getting in spouse's face. No direct physical violence towards spouse today. Police were called with report made pt not removed. Spouse reports after pt was cooking soriano and threw frying pan with grease at 30 year old son, Nelson, who then called police again. Pt was then saying he wanted police to arrest son because he is threatening him however son said to only be saying that they will not tolerate behavior and will call the police. Pt then became aggressive with  and then taken to ED by them.     Pt otherwise is reported to be at baseline alert and oriented. Pt said to be just short tempered. Pt smoking marijuana 5x a day. Pt paranoid about spouse having stolen his money "for whatever reason". Pt acting extremely jealous and fixated on things from years ago. Pt taking money out of bank spouse says 50K. Spouse reports that pt says I should just commit suicide is that what you want me to do, etc. Pt also says "I can be dead tomorrow" and "going off and vanishing" as per son. No known past attempts or SIB. Spouse concerned for pt being informed of information reported by family due to ongoing rage directed at spouse. Son said to also be very concerned. Spouse reports she is hoping pt could be helped back to baseline. No prior psychiatric admissions.]

## 2022-06-23 NOTE — BH INPATIENT PSYCHIATRY DISCHARGE NOTE - NSBHDCRISKMITIGATEFT_PSY_ALL_CORE
Suicidal and aggression risk assessments were performed on the day of discharge. The patient is at elevated chronic risk of self-harm due to an underlying unspecified mood disorder and TBI. Patient is not actively aggressive or suicidal, making them appropriate for less restrictive treatment as an outpatient without need for continued inpatient hospitalization. Protective factors for suicide and aggression include supportive family, no hx SA/NSSIB, denies SIIP/HIIP, no access to firearms, housing stability. Risk factors include hx aggression towards family, lack of insight, hx TBI. Immediate risk was minimized by inpatient admission to a safe environment with appropriate supervision and limited access to lethal means. Future risk was minimized before discharge by treatment of mood symptoms, maximizing outpatient support, providing relevant patient education, discussing emergency procedures, and ensuring close follow-up.

## 2022-06-23 NOTE — BH INPATIENT PSYCHIATRY DISCHARGE NOTE - NSDCRECOMMENDMEDICALFT_PSY_ALL_CORE
Please follow-up with Hand Surgery in 3-4 weeks for evaluation of your R thumb fracture. 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

## 2022-06-23 NOTE — BH INPATIENT PSYCHIATRY PROGRESS NOTE - NSBHFUPINTERVALHXFT_PSY_A_CORE
Patient seen for follow-up of TBI. No PRNs since last evaluation.     Tentative discharge for tomorrow discussed with patient. He is agreeable to [facility] f/u appt on Monday. He is adamant that he will not flee to [place] after discharge (despite family's concern otherwise). He mentions that he felt groggy with increase in Risperdal. He also reports no BM for several days, will try miralax and Dulcolax PRNs.     Collateral obtained from patient's family (wife and son) yesterday. They reports patient has been calmer recently, but note that he can get bothered with other agitated patients. They report that he continues to be in denial about increased irritability, but recognize that this will require outpatient therapy rather than acute hospitalization to improve. They express concern that patient will flee to [place] after d/c (as he said this to son last week) but understand that this risk cannot be completely eliminated. They are agreeable with patient returning home tomorrow and do not have acute safety concerns at this time. Patient's wife will be returning from [place] tomorrow evening.

## 2022-06-23 NOTE — BH DISCHARGE NOTE NURSING/SOCIAL WORK/PSYCH REHAB - NSDCPRRECOMMEND_PSY_ALL_CORE
Patient is scheduled to be discharged today and will return to his previous residence where he lives with his wife. Writer encouraged patient to maintain mediocation compliance, to utilize his coping skills and to participate in structured leisure activities.

## 2022-06-23 NOTE — BH DISCHARGE NOTE NURSING/SOCIAL WORK/PSYCH REHAB - NSDCPEFALRISK_GEN_ALL_CORE
For information on Fall & Injury Prevention, visit: [email or web address] OR  [email or web address] OR  https://www.cdc.gov/steadi/patient.html

## 2022-06-23 NOTE — BH DISCHARGE NOTE NURSING/SOCIAL WORK/PSYCH REHAB - NSCDUDCCRISIS_PSY_A_CORE
[facility] Well  1 (411) [facility]-WELL ([phone])  Text "WELL" to 29657  Website: www.[employer]/.[employer] [facility]iz[employer] 1 (446) 551-RFHM (8137) Website: www.safehorizon.org/.National Suicide Prevention Lifeline 8 (355) 525-2935/.  [employer]  1 (815) LIFENET (213-8486)/.  [facility]’s Behavioral [facility] Crisis [facility]  [address] [place] [address] [phone]   Hours:  Monday through Friday from 9 AM to 3 PM [facility] Well  1 (392) [facility]-WELL ([phone])  Text "WELL" to 97494  Website: www.Company/.Safe Horizons 1 (977) 331-PSQU (8190) Website: www.safehorizon.org/.National Suicide Prevention Lifeline 7 (520) 263-2845/.  [employer]  1 (738) LIFENET (967-2137)/.  [facility]’s Behavioral Health Crisis Center  [address] [place] [address] [phone]   Hours:  Monday through Friday from 9 AM to 3 PM/.  [facility] Crisis Line  5 [phone]) 912-8993, Option 1 [facility] Well  1 (260) [facility]-WELL ([phone])  Text "WELL" to 94773  Website: www.[employer]/.Safe Horizons 1 (421) 651-VSZI (0452) Website: www.safehorizon.org/.National Suicide Prevention Lifeline 1 (663) 061-3117/.  [employer]  1 (132) LIFENET (045-9857)/.  [facility]’s Behavioral Health Crisis Center  [address] [place] [address] [phone]   Hours:  Monday through Friday from 9 AM to 3 PM [facility] Well  1 (869) [facility]-WELL ([phone])  Text "WELL" to 54915  Website: www.[employer]/.Safe Horizons 1 (806) 221-BVTG (9947) Website: www.safehorizon.org/.National Suicide Prevention Lifeline 4 (487) 296-2076/.  [employer]  1 (710) LIFENET (788-8489)/.  [facility]’s Behavioral Health Crisis Center  [address] [place] [address] [phone]   Hours:  Monday through Friday from 9 AM to 3 PM/.  [facility]  Aff[facility] - [facility] Crisis Line  7 [phone], Option 1

## 2022-06-23 NOTE — BH INPATIENT PSYCHIATRY PROGRESS NOTE - CURRENT MEDICATION
MEDICATIONS  (STANDING):  hydrocortisone 5 milliGRAM(s) Oral at bedtime  hydrocortisone 10 milliGRAM(s) Oral daily  lacosamide 50 milliGRAM(s) Oral two times a day  levETIRAcetam 500 milliGRAM(s) Oral two times a day  levothyroxine 200 MICROGram(s) Oral <User Schedule>  levothyroxine 400 MICROGram(s) Oral <User Schedule>  mometasone 200 MICROgram(s)/formoterol 5 MICROgram(s) Inhaler 2 Puff(s) Inhalation two times a day  Norditropin FlexPro 10mg/1.5ml 0.4 milliGRAM(s) 0.4 milliGRAM(s) SubCutaneous daily  polyethylene glycol 3350 17 Gram(s) Oral daily  risperiDONE  Disintegrating Tablet 3 milliGRAM(s) Oral at bedtime  sertraline 200 milliGRAM(s) Oral at bedtime  Testosterone Gel Pump 1.62% (Pt Own) 7 Pump(s) 7 Application(s) Topical daily    MEDICATIONS  (PRN):  acetaminophen     Tablet .. 650 milliGRAM(s) Oral every 6 hours PRN Mild Pain (1 - 3), Moderate Pain (4 - 6)  ALBUTerol    90 MICROgram(s) HFA Inhaler 1 Puff(s) Inhalation Once PRN Bronchospasm  bisacodyl 5 milliGRAM(s) Oral every 12 hours PRN Constipation  diphenhydrAMINE 50 milliGRAM(s) Oral every 6 hours PRN Agitation  diphenhydrAMINE Injectable 50 milliGRAM(s) IntraMuscular once PRN Agitation  haloperidol     Tablet 5 milliGRAM(s) Oral every 6 hours PRN agitation  haloperidol    Injectable 5 milliGRAM(s) IntraMuscular once PRN agitation  LORazepam     Tablet 2 milliGRAM(s) Oral every 6 hours PRN agitation  LORazepam   Injectable 2 milliGRAM(s) IntraMuscular once PRN Agitation

## 2022-06-23 NOTE — BH INPATIENT PSYCHIATRY DISCHARGE NOTE - OTHER PAST PSYCHIATRIC HISTORY (INCLUDE DETAILS REGARDING ONSET, COURSE OF ILLNESS, INPATIENT/OUTPATIENT TREATMENT)
Pt has been in outpatient psychiatric care for ten years,  currently, Dr. Dipika Vaaldez [phone].  Pt had meningioma with removal/radiation, pituitary insufficiency.  Since that time, pt has reportedly had issues with anger, short temper.  More recently this has intensified and directed toward spouse, with calls to police by family.  Pt taking Adderall for 8-9 years until a month ago.  Pt suffered a seizure 9/2001

## 2022-06-23 NOTE — BH DISCHARGE NOTE NURSING/SOCIAL WORK/PSYCH REHAB - PATIENT PORTAL LINK FT
You can access the FollowMyHealth Patient Portal offered by [facility] by registering at the following website: [email or web address]. By joining [employer]’s FollowMyHealth portal, you will also be able to view your health information using other applications (apps) compatible with our system.

## 2022-06-23 NOTE — BH INPATIENT PSYCHIATRY DISCHARGE NOTE - NSDCPROCEDURESFT_PSY_ALL_CORE
CT Head No Cont (06.11.22 @ 02:27)   EXAM:  CT BRAIN                        PROCEDURE DATE:  06/11/2022  IMPRESSION: Postsurgical changes. Findings in the left middle cranial fossa/left optic nerve sheath, which may be related to residual/recurrent meningioma. Prior study is not available at this time. Recommend comparison to previous outside study. Follow-up contrast enhanced MRI would be helpful for further evaluation.    Xray Hand 3 Views, Right (06.12.22 @ 13:11)  EXAM:  XR HAND MIN 3 VIEWS RT                        PROCEDURE DATE:  06/12/2022  FINDINGS: Slightly displaced fracture of the base of the first distal phalanx.  Bipartite sesamoid bone at the first interphalangeal joint.  Preserved joint spaces. Soft tissue swelling about the thenar eminence.  IMPRESSION: Fracture, first distal phalanx.    Testosterone, Free and Total (06.15.22 @ 09:26)   Testosterone, Free: 5.0 pg/mL (low)  Testosterone, Total Serum: 448.0ng/dL

## 2022-06-23 NOTE — BH INPATIENT PSYCHIATRY DISCHARGE NOTE - VIOLENCE RISK FACTORS:
Violent ideation/threat/speech/Substance abuse/Affective dysregulation/Impulsivity/Lack of insight into violence risk/need for treatment/Irritability/Elopement history or risk

## 2022-06-23 NOTE — BH DISCHARGE NOTE NURSING/SOCIAL WORK/PSYCH REHAB - NSDCADDINFO1FT_PSY_ALL_CORE
This intake appointment will be held virtually.  You will receive a call on your cell phone at approximately 915 am, to start registration, and assistance in linking with your virtual call that will take place soon after your registration phone call.

## 2022-06-23 NOTE — BH INPATIENT PSYCHIATRY DISCHARGE NOTE - DESCRIPTION
Was an , now on SSI s/p resection of meningioma,  over 30 years in the process of divorce, 1 adult son who lives in [place] but is currently visiting.

## 2022-06-23 NOTE — BH INPATIENT PSYCHIATRY PROGRESS NOTE - NSBHCHARTREVIEWVS_PSY_A_CORE FT
Vital Signs Last 24 Hrs  T(C): 37 (06-23-22 @ 07:39), Max: 37 (06-23-22 @ 07:39)  T(F): 98.6 (06-23-22 @ 07:39), Max: 98.6 (06-23-22 @ 07:39)  HR: --  BP: --  BP(mean): --  RR: --  SpO2: --    Orthostatic VS  06-23-22 @ 07:39  Lying BP: --/-- HR: --  Sitting BP: 157/82 HR: 84  Standing BP: --/-- HR: --  Site: upper left arm  Mode: electronic   Vital Signs Last 24 Hrs  T(C): 37 (06-23-22 @ 07:39), Max: 37 (06-23-22 @ 07:39)  T(F): 98.6 (06-23-22 @ 07:39), Max: 98.6 (06-23-22 @ 07:39)  HR: --  BP: --  BP(mean): --  RR: --  SpO2: --    Orthostatic VS  06-23-22 @ 07:39  Lying BP: --/-- HR: --  Sitting BP: 157/82 HR: 84  Standing BP: --/-- HR: --  Site: upper left arm  Mode: electronic  Orthostatic VS  06-22-22 @ 07:59  Lying BP: --/-- HR: --  Sitting BP: 119/84 HR: 91  Standing BP: --/-- HR: --  Site: upper left arm  Mode: electronic

## 2022-06-23 NOTE — BH INPATIENT PSYCHIATRY DISCHARGE NOTE - NSDCCPCAREPLAN_GEN_ALL_CORE_FT
PRINCIPAL DISCHARGE DIAGNOSIS  Diagnosis: Unspecified mood [affective] disorder  Assessment and Plan of Treatment: Please follow-up with your intake at the [facility] for further monitoring of your mood. Please continue to take Risperdal (aka risperidone) 3mg at bedtime and Zoloft (aka sertraline) 200mg daily.      SECONDARY DISCHARGE DIAGNOSES  Diagnosis: Mild alcohol use disorder  Assessment and Plan of Treatment: Please follow-up with your intake at the [facility].    Diagnosis: TBI (traumatic brain injury)  Assessment and Plan of Treatment: Please follow-up with your neurologist, Dr. Castellanos. Please continue to take Vimpat (aka lacosamide) 50mg two times per day and Keppra (aka levetiracetam) 500mg two times per day to prevent seizures.

## 2022-06-24 VITALS — TEMPERATURE: 98 F

## 2022-06-24 PROCEDURE — 99231 SBSQ HOSP IP/OBS SF/LOW 25: CPT

## 2022-06-24 PROCEDURE — 99238 HOSP IP/OBS DSCHRG MGMT 30/<: CPT

## 2022-06-24 RX ADMIN — Medication 200 MICROGRAM(S): at 06:26

## 2022-06-24 RX ADMIN — MOMETASONE FUROATE AND FORMOTEROL FUMARATE DIHYDRATE 2 PUFF(S): 200; 5 AEROSOL RESPIRATORY (INHALATION) at 08:53

## 2022-06-24 RX ADMIN — LEVETIRACETAM 500 MILLIGRAM(S): 250 TABLET, FILM COATED ORAL at 08:53

## 2022-06-24 RX ADMIN — LACOSAMIDE 50 MILLIGRAM(S): 50 TABLET ORAL at 08:53

## 2022-06-24 RX ADMIN — Medication 10 MILLIGRAM(S): at 06:26

## 2022-06-24 NOTE — BH INPATIENT PSYCHIATRY PROGRESS NOTE - NSTXIMPULSGOAL_PSY_ALL_CORE
Will report using a relaxation skill daily to delay screaming and touching others when upset
Will be able to demonstrate the ability to pause before acting out negatively
Will be able to demonstrate the ability to pause before acting out negatively
Will report using a relaxation skill daily to delay screaming and touching others when upset
Will be able to demonstrate the ability to pause before acting out negatively
Will report using a relaxation skill daily to delay screaming and touching others when upset
Will report using a relaxation skill daily to delay screaming and touching others when upset
Will be able to demonstrate the ability to pause before acting out negatively
Will report using a relaxation skill daily to delay screaming and touching others when upset
Will report using a relaxation skill daily to delay screaming and touching others when upset

## 2022-06-24 NOTE — BH INPATIENT PSYCHIATRY PROGRESS NOTE - NSTXCOPEDATETRGT_PSY_ALL_CORE
18-Jun-2022
23-Jun-2022
23-Jun-2022
18-Jun-2022
23-Jun-2022
18-Jun-2022
23-Jun-2022
30-Jun-2022
23-Jun-2022
18-Jun-2022
23-Jun-2022
23-Jun-2022

## 2022-06-24 NOTE — BH INPATIENT PSYCHIATRY PROGRESS NOTE - NSTXPROBIMPULS_PSY_ALL_CORE
IMPULSIVITY/AGITATION

## 2022-06-24 NOTE — BH INPATIENT PSYCHIATRY PROGRESS NOTE - MSE UNSTRUCTURED FT
The patient appears stated age, dressed appropriately. The patient is superficially cooperative, guarded about anger/irritability, substance use, and events leading to hospitalization. Psychomotor agitation noted. The patient's speech was fluent, normal in tone, rate, and volume.  The patient's mood is "good". Affect is anxious, incongruent with mood.  Thought process is linear. No delusional content.  Denies any suicidal or homicidal ideation, intent, or plan. Denies hallucinations.  Cognition AAOx3. Insight is poor.  Judgment is poor.  Impulse control is tenuous.
The patient appears stated age, disheveled. The patient is superficially cooperative, guarded about anger/irritability, substance use, and events leading to hospitalization. Psychomotor agitation noted. The patient's speech was fluent, normal in tone, rate, and volume.  The patient's mood is "fine". Affect is anxious, incongruent with mood.  Thought process is linear. No delusional content.  Denies any suicidal or homicidal ideation, intent, or plan. Denies hallucinations.  Cognition AAOx3. Insight is poor.  Judgment is poor.  Impulse control is tenuous.
The patient appears stated age, disheveled, sitting outside with CO 1:1 during interview. The patient is superficially cooperative, guarded about anger/irritability and events leading to hospitalization. Psychomotor agitation noted. The patient's speech was fluent, normal in tone, rate, and volume.  The patient's mood is "fine."  Affect is anxious, incongruent with mood.  Thought process is linear. No delusional content.  Denies any suicidal or homicidal ideation, intent, or plan. Denies hallucinations.  Cognition AAOx3. Insight is poor.  Judgment is poor.  Impulse control is tenuous.    
The patient appears stated age, fair hygiene and dressed appropriately. The patient is superficially cooperative, guarded about anger/irritability and events leading to hospitalization. Psychomotor agitation noted The patient's speech was fluent, normal in tone, rate, and volume.  The patient's mood is "good."  Affect is anxious, incongruent with mood.  Thought process is linear. No delusional content.  Denies any suicidal or homicidal ideation, intent, or plan. Denies hallucinations.  Cognition AAOx3. Insight is poor.  Judgment is poor.  Impulse control is tenuous.    
The patient appears stated age, disheveled, sitting outside with CO 1:1 during interview. The patient is superficially cooperative, guarded about anger/irritability and events leading to hospitalization. Psychomotor agitation noted. The patient's speech was fluent, normal in tone, rate, and volume.  The patient's mood is "fine."  Affect is anxious, incongruent with mood.  Thought process is linear. No delusional content.  Denies any suicidal or homicidal ideation, intent, or plan. Denies hallucinations.  Cognition AAOx3. Insight is poor.  Judgment is poor.  Impulse control is tenuous.    
The patient appears stated age, disheveled. The patient is superficially cooperative, guarded about anger/irritability, substance use, and events leading to hospitalization. Psychomotor agitation noted. The patient's speech was fluent, normal in tone, rate, and volume.  The patient's mood is "good". Affect is anxious, incongruent with mood.  Thought process is linear. No delusional content.  Denies any suicidal or homicidal ideation, intent, or plan. Denies hallucinations.  Cognition AAOx3. Insight is poor.  Judgment is poor.  Impulse control is tenuous.
The patient appears stated age, dressed appropriately. The patient is superficially cooperative, guarded about anger/irritability, substance use, and events leading to hospitalization. Psychomotor agitation noted. The patient's speech was fluent, normal in tone, rate, and volume.  The patient's mood is "fine". Affect is anxious, incongruent with mood.  Thought process is linear. No delusional content.  Denies any suicidal or homicidal ideation, intent, or plan. Denies hallucinations.  Cognition AAOx3. Insight is poor.  Judgment is poor.  Impulse control is tenuous.
Patient is awake and alert. Affect is neutral, smiles appropriately. Speech is fluent. TP is coherent. Minimizes symptoms in general. Denies   paranoia. No hallucinations. "I just don't want to be here any longer than I have to." No suicidal ideations. Limited insight. No tremor or EPS.    
The patient appears stated age, disheveled, sitting outside during interview. The patient is superficially cooperative, guarded about anger/irritability and events leading to hospitalization. Psychomotor agitation noted. The patient's speech was fluent, normal in tone, rate, and volume.  The patient's mood is "fine."  Affect is anxious, incongruent with mood.  Thought process is linear. No delusional content.  Denies any suicidal or homicidal ideation, intent, or plan. Denies hallucinations.  Cognition AAOx3. Insight is poor.  Judgment is poor.  Impulse control is tenuous.    
The patient appears stated age, disheveled, sitting outside during interview. The patient is superficially cooperative, guarded about anger/irritability, substance use, and events leading to hospitalization. Psychomotor agitation noted. The patient's speech was fluent, normal in tone, rate, and volume.  The patient's mood is "good". Affect is anxious, incongruent with mood.  Thought process is linear. No delusional content.  Denies any suicidal or homicidal ideation, intent, or plan. Denies hallucinations.  Cognition AAOx3. Insight is poor.  Judgment is poor.  Impulse control is tenuous.    
Patient is awake and alert. Affect is neutral, smiles appropriately. Speech is fluent. TP is coherent. Minimizes symptoms in general, reports "my son is moving  so he can't visit." Denies  paranoia. No hallucinations. "I just don't want to be here any longer than I have to." No suicidal ideations. Limited insight into condition. No tremor or EPS.    
The patient appears stated age, dressed appropriately. The patient is superficially cooperative, guarded about anger/irritability, substance use, and events leading to hospitalization. Psychomotor agitation noted. The patient's speech was fluent, normal in tone, rate, and volume.  The patient's mood is "good". Affect is euthymic. Thought process is linear. No delusional content.  Denies any suicidal or homicidal ideation, intent, or plan. Denies hallucinations.  Cognition AAOx3. Insight is poor.  Judgment is poor.  Impulse control is tenuous.

## 2022-06-24 NOTE — BH INPATIENT PSYCHIATRY PROGRESS NOTE - NSTXSUICIDDATEEST_PSY_ALL_CORE
11-Jun-2022
16-Jun-2022
11-Jun-2022
11-Jun-2022
16-Jun-2022
16-Jun-2022
11-Jun-2022
16-Jun-2022

## 2022-06-24 NOTE — BH INPATIENT PSYCHIATRY PROGRESS NOTE - NSBHATTESTTYPEVISIT_PSY_A_CORE
Attending with Resident/Fellow/Student

## 2022-06-24 NOTE — BH INPATIENT PSYCHIATRY PROGRESS NOTE - PRN MEDS
MEDICATIONS  (PRN):  acetaminophen     Tablet .. 650 milliGRAM(s) Oral every 6 hours PRN Mild Pain (1 - 3), Moderate Pain (4 - 6)  ALBUTerol    90 MICROgram(s) HFA Inhaler 1 Puff(s) Inhalation Once PRN Bronchospasm  diphenhydrAMINE 50 milliGRAM(s) Oral every 6 hours PRN Agitation  diphenhydrAMINE Injectable 50 milliGRAM(s) IntraMuscular once PRN Agitation  haloperidol     Tablet 5 milliGRAM(s) Oral every 6 hours PRN agitation  haloperidol    Injectable 5 milliGRAM(s) IntraMuscular once PRN agitation  LORazepam     Tablet 2 milliGRAM(s) Oral every 6 hours PRN agitation  LORazepam   Injectable 2 milliGRAM(s) IntraMuscular once PRN Agitation  
MEDICATIONS  (PRN):  acetaminophen     Tablet .. 650 milliGRAM(s) Oral every 6 hours PRN Mild Pain (1 - 3), Moderate Pain (4 - 6)  ALBUTerol    90 MICROgram(s) HFA Inhaler 1 Puff(s) Inhalation Once PRN Bronchospasm  diphenhydrAMINE 50 milliGRAM(s) Oral every 6 hours PRN Agitation  diphenhydrAMINE Injectable 50 milliGRAM(s) IntraMuscular once PRN Agitation  haloperidol     Tablet 5 milliGRAM(s) Oral every 6 hours PRN agitation  haloperidol    Injectable 5 milliGRAM(s) IntraMuscular once PRN agitation  LORazepam     Tablet 2 milliGRAM(s) Oral every 6 hours PRN agitation  LORazepam   Injectable 2 milliGRAM(s) IntraMuscular once PRN Agitation  
MEDICATIONS  (PRN):  acetaminophen     Tablet .. 650 milliGRAM(s) Oral every 6 hours PRN Mild Pain (1 - 3), Moderate Pain (4 - 6)  ALBUTerol    90 MICROgram(s) HFA Inhaler 1 Puff(s) Inhalation Once PRN Bronchospasm  bisacodyl 5 milliGRAM(s) Oral every 12 hours PRN Constipation  diphenhydrAMINE 50 milliGRAM(s) Oral every 6 hours PRN Agitation  diphenhydrAMINE Injectable 50 milliGRAM(s) IntraMuscular once PRN Agitation  haloperidol     Tablet 5 milliGRAM(s) Oral every 6 hours PRN agitation  haloperidol    Injectable 5 milliGRAM(s) IntraMuscular once PRN agitation  LORazepam     Tablet 2 milliGRAM(s) Oral every 6 hours PRN agitation  LORazepam   Injectable 2 milliGRAM(s) IntraMuscular once PRN Agitation  
MEDICATIONS  (PRN):  acetaminophen     Tablet .. 650 milliGRAM(s) Oral every 6 hours PRN Mild Pain (1 - 3), Moderate Pain (4 - 6)  ALBUTerol    90 MICROgram(s) HFA Inhaler 1 Puff(s) Inhalation Once PRN Bronchospasm  diphenhydrAMINE 50 milliGRAM(s) Oral every 6 hours PRN Agitation  diphenhydrAMINE Injectable 50 milliGRAM(s) IntraMuscular once PRN Agitation  haloperidol     Tablet 5 milliGRAM(s) Oral every 6 hours PRN agitation  haloperidol    Injectable 5 milliGRAM(s) IntraMuscular once PRN agitation  LORazepam     Tablet 2 milliGRAM(s) Oral every 6 hours PRN agitation  LORazepam   Injectable 2 milliGRAM(s) IntraMuscular once PRN Agitation  
MEDICATIONS  (PRN):  acetaminophen     Tablet .. 650 milliGRAM(s) Oral every 6 hours PRN Mild Pain (1 - 3), Moderate Pain (4 - 6)  ALBUTerol    90 MICROgram(s) HFA Inhaler 1 Puff(s) Inhalation Once PRN Bronchospasm  diphenhydrAMINE 50 milliGRAM(s) Oral every 6 hours PRN Agitation  diphenhydrAMINE Injectable 50 milliGRAM(s) IntraMuscular once PRN Agitation  haloperidol     Tablet 5 milliGRAM(s) Oral every 6 hours PRN agitation  haloperidol    Injectable 5 milliGRAM(s) IntraMuscular once PRN agitation  LORazepam     Tablet 2 milliGRAM(s) Oral every 6 hours PRN agitation  LORazepam   Injectable 2 milliGRAM(s) IntraMuscular once PRN Agitation  polyethylene glycol 3350 17 Gram(s) Oral daily PRN constipation  
MEDICATIONS  (PRN):  acetaminophen     Tablet .. 650 milliGRAM(s) Oral every 6 hours PRN Mild Pain (1 - 3), Moderate Pain (4 - 6)  ALBUTerol    90 MICROgram(s) HFA Inhaler 1 Puff(s) Inhalation Once PRN Bronchospasm  diphenhydrAMINE 50 milliGRAM(s) Oral every 6 hours PRN Agitation  diphenhydrAMINE Injectable 50 milliGRAM(s) IntraMuscular once PRN Agitation  haloperidol     Tablet 5 milliGRAM(s) Oral every 6 hours PRN agitation  haloperidol    Injectable 5 milliGRAM(s) IntraMuscular once PRN agitation  LORazepam     Tablet 2 milliGRAM(s) Oral every 6 hours PRN agitation  LORazepam   Injectable 2 milliGRAM(s) IntraMuscular once PRN Agitation  
MEDICATIONS  (PRN):  acetaminophen     Tablet .. 650 milliGRAM(s) Oral every 6 hours PRN Mild Pain (1 - 3), Moderate Pain (4 - 6)  ALBUTerol    90 MICROgram(s) HFA Inhaler 1 Puff(s) Inhalation Once PRN Bronchospasm  diphenhydrAMINE 50 milliGRAM(s) Oral every 6 hours PRN Agitation  diphenhydrAMINE Injectable 50 milliGRAM(s) IntraMuscular once PRN Agitation  haloperidol     Tablet 5 milliGRAM(s) Oral every 6 hours PRN agitation  haloperidol    Injectable 5 milliGRAM(s) IntraMuscular once PRN agitation  LORazepam     Tablet 2 milliGRAM(s) Oral every 6 hours PRN agitation  LORazepam   Injectable 2 milliGRAM(s) IntraMuscular once PRN Agitation  
MEDICATIONS  (PRN):  acetaminophen     Tablet .. 650 milliGRAM(s) Oral every 6 hours PRN Mild Pain (1 - 3), Moderate Pain (4 - 6)  ALBUTerol    90 MICROgram(s) HFA Inhaler 1 Puff(s) Inhalation Once PRN Bronchospasm  bisacodyl 5 milliGRAM(s) Oral every 12 hours PRN Constipation  diphenhydrAMINE 50 milliGRAM(s) Oral every 6 hours PRN Agitation  diphenhydrAMINE Injectable 50 milliGRAM(s) IntraMuscular once PRN Agitation  haloperidol     Tablet 5 milliGRAM(s) Oral every 6 hours PRN agitation  haloperidol    Injectable 5 milliGRAM(s) IntraMuscular once PRN agitation  LORazepam     Tablet 2 milliGRAM(s) Oral every 6 hours PRN agitation  LORazepam   Injectable 2 milliGRAM(s) IntraMuscular once PRN Agitation  
MEDICATIONS  (PRN):  acetaminophen     Tablet .. 650 milliGRAM(s) Oral every 6 hours PRN Mild Pain (1 - 3), Moderate Pain (4 - 6)  ALBUTerol    90 MICROgram(s) HFA Inhaler 1 Puff(s) Inhalation Once PRN Bronchospasm  diphenhydrAMINE 50 milliGRAM(s) Oral every 6 hours PRN Agitation  diphenhydrAMINE Injectable 50 milliGRAM(s) IntraMuscular once PRN Agitation  haloperidol     Tablet 5 milliGRAM(s) Oral every 6 hours PRN agitation  haloperidol    Injectable 5 milliGRAM(s) IntraMuscular once PRN agitation  LORazepam     Tablet 2 milliGRAM(s) Oral every 6 hours PRN agitation  LORazepam   Injectable 2 milliGRAM(s) IntraMuscular once PRN Agitation  

## 2022-06-24 NOTE — BH INPATIENT PSYCHIATRY PROGRESS NOTE - NSTXSUICIDDATETRGT_PSY_ALL_CORE
18-Jun-2022
23-Jun-2022
23-Jun-2022
18-Jun-2022
23-Jun-2022
18-Jun-2022
23-Jun-2022
23-Jun-2022
30-Jun-2022
18-Jun-2022
23-Jun-2022
23-Jun-2022

## 2022-06-24 NOTE — BH INPATIENT PSYCHIATRY PROGRESS NOTE - NSTXALCDRGPROGRES_PSY_ALL_CORE
Met - goal discontinued

## 2022-06-24 NOTE — BH INPATIENT PSYCHIATRY PROGRESS NOTE - CURRENT MEDICATION
MEDICATIONS  (STANDING):  hydrocortisone 5 milliGRAM(s) Oral at bedtime  hydrocortisone 10 milliGRAM(s) Oral daily  levETIRAcetam 500 milliGRAM(s) Oral two times a day  levothyroxine 200 MICROGram(s) Oral <User Schedule>  levothyroxine 400 MICROGram(s) Oral <User Schedule>  mometasone 200 MICROgram(s)/formoterol 5 MICROgram(s) Inhaler 2 Puff(s) Inhalation two times a day  Norditropin FlexPro 10mg/1.5ml 0.4 milliGRAM(s) 0.4 milliGRAM(s) SubCutaneous daily  polyethylene glycol 3350 17 Gram(s) Oral daily  risperiDONE  Disintegrating Tablet 3 milliGRAM(s) Oral at bedtime  sertraline 200 milliGRAM(s) Oral at bedtime  Testosterone Gel Pump 1.62% (Pt Own) 7 Pump(s) 7 Application(s) Topical daily    MEDICATIONS  (PRN):  acetaminophen     Tablet .. 650 milliGRAM(s) Oral every 6 hours PRN Mild Pain (1 - 3), Moderate Pain (4 - 6)  ALBUTerol    90 MICROgram(s) HFA Inhaler 1 Puff(s) Inhalation Once PRN Bronchospasm  bisacodyl 5 milliGRAM(s) Oral every 12 hours PRN Constipation  diphenhydrAMINE 50 milliGRAM(s) Oral every 6 hours PRN Agitation  diphenhydrAMINE Injectable 50 milliGRAM(s) IntraMuscular once PRN Agitation  haloperidol     Tablet 5 milliGRAM(s) Oral every 6 hours PRN agitation  haloperidol    Injectable 5 milliGRAM(s) IntraMuscular once PRN agitation  LORazepam     Tablet 2 milliGRAM(s) Oral every 6 hours PRN agitation  LORazepam   Injectable 2 milliGRAM(s) IntraMuscular once PRN Agitation

## 2022-06-24 NOTE — BH INPATIENT PSYCHIATRY PROGRESS NOTE - NSTXCOPEDATEEST_PSY_ALL_CORE
16-Jun-2022
16-Jun-2022
11-Jun-2022
17-Jun-2022
16-Jun-2022
11-Jun-2022
16-Jun-2022
16-Jun-2022
11-Jun-2022
11-Jun-2022

## 2022-06-24 NOTE — BH INPATIENT PSYCHIATRY PROGRESS NOTE - NSTXSUICIDINTERMD_PSY_ALL_CORE
medication titration

## 2022-06-24 NOTE — BH INPATIENT PSYCHIATRY PROGRESS NOTE - NSBHFUPINTERVALHXFT_PSY_A_CORE
Patient seen for follow-up of TBI. He has remained in behavioral control, no PRNs since last evaluation.     The patient is scheduled for discharge today. He is looking forward to returning home with his wife. He states it was difficult being hospitalized for 2 weeks but he found meaning in helping other patients. He acknowledges that there is an issue that led to his admission, which he needs to address. He agrees to work on communication with his family. He is agreeable to c/w risperidone and to f/u at [facility].

## 2022-06-24 NOTE — BH INPATIENT PSYCHIATRY PROGRESS NOTE - NSBHATTESTBILLINGAW_PSY_A_CORE
[id number] Inpatient care - low complexity - 15 minutes
[id number] Inpatient care - low complexity - 15 minutes
[id number] Inpatient care - moderate complexity - 25 minutes

## 2022-06-24 NOTE — BH INPATIENT PSYCHIATRY PROGRESS NOTE - NSBHCHARTREVIEWVS_PSY_A_CORE FT
Vital Signs Last 24 Hrs  T(C): 36.8 (06-24-22 @ 07:44), Max: 36.8 (06-24-22 @ 07:44)  T(F): 98.2 (06-24-22 @ 07:44), Max: 98.2 (06-24-22 @ 07:44)  HR: --  BP: --  BP(mean): --  RR: --  SpO2: --    Orthostatic VS  06-24-22 @ 07:44  Lying BP: --/-- HR: --  Sitting BP: 125/89 HR: 89  Standing BP: --/-- HR: --  Site: --  Mode: -- Vital Signs Last 24 Hrs  T(C): 36.8 (06-24-22 @ 07:44), Max: 36.8 (06-24-22 @ 07:44)  T(F): 98.2 (06-24-22 @ 07:44), Max: 98.2 (06-24-22 @ 07:44)  HR: --  BP: --  BP(mean): --  RR: --  SpO2: --    Orthostatic VS  06-24-22 @ 07:44  Lying BP: --/-- HR: --  Sitting BP: 125/89 HR: 89  Standing BP: --/-- HR: --  Site: --  Mode: --  Orthostatic VS  06-23-22 @ 07:39  Lying BP: --/-- HR: --  Sitting BP: 157/82 HR: 84  Standing BP: --/-- HR: --  Site: upper left arm  Mode: electronic

## 2022-06-24 NOTE — BH INPATIENT PSYCHIATRY PROGRESS NOTE - NSICDXBHSECONDARYDX_PSY_ALL_CORE
1. Pain in both wrists  - XR WRIST LEFT (2 VIEWS); Future  - XR WRIST RIGHT (2 VIEWS); Future  -Continue Diclofenac 75mg 2 times daily as needed  - Referral to [facility], Hand Surgery (Hand, Wrist, Elbow), [place]    2. Numbness and tingling in left hand  - Referral to [facility], MD, Hand Surgery (Hand, Wrist, Elbow), [place]  - EMG; Future    3.  Hyperglycemia  - POCT glycosylated hemoglobin (Hb A1C)
Mild alcohol use disorder   F10.10  Mild cannabis use disorder   F12.10  Unspecified mood [affective] disorder   F39  

## 2022-06-24 NOTE — BH INPATIENT PSYCHIATRY PROGRESS NOTE - NSDCCRITERIA_PSY_ALL_CORE
Discharge to home when there is sustained absence of psychosis and aggression

## 2022-06-24 NOTE — BH INPATIENT PSYCHIATRY PROGRESS NOTE - NSTXSUICIDGOAL_PSY_ALL_CORE
Will identify and utilize 2 coping skills
Will identify and utilize 2 coping skills
Will identify 1 trigger / stressor that exacerbates suicidal
Will identify 1 trigger / stressor that exacerbates suicidal
Will identify and utilize 2 coping skills
Will identify 1 trigger / stressor that exacerbates suicidal
Will identify and utilize 2 coping skills
Will identify 1 trigger / stressor that exacerbates suicidal
Will identify and utilize 2 coping skills
Will identify and utilize 2 coping skills

## 2022-06-24 NOTE — BH INPATIENT PSYCHIATRY PROGRESS NOTE - NSTXDCOTHRDATEEST_PSY_ALL_CORE
13-Jun-2022
21-Jun-2022
13-Jun-2022
13-Jun-2022
21-Jun-2022
13-Jun-2022
21-Jun-2022

## 2022-06-24 NOTE — BH INPATIENT PSYCHIATRY PROGRESS NOTE - NSCGISEVERILLNESS_PSY_ALL_CORE
4 = Moderately ill – overt symptoms causing noticeable, but modest, functional impairment or distress; symptom level may warrant medication
4 = Moderately ill – overt symptoms causing noticeable, but modest, functional impairment or distress; symptom level may warrant medication
6 = Severely ill - disruptive pathology, behavior and function are frequently influenced by symptoms, may require assistance from others
6 = Severely ill - disruptive pathology, behavior and function are frequently influenced by symptoms, may require assistance from others
4 = Moderately ill – overt symptoms causing noticeable, but modest, functional impairment or distress; symptom level may warrant medication
4 = Moderately ill – overt symptoms causing noticeable, but modest, functional impairment or distress; symptom level may warrant medication
6 = Severely ill - disruptive pathology, behavior and function are frequently influenced by symptoms, may require assistance from others
4 = Moderately ill – overt symptoms causing noticeable, but modest, functional impairment or distress; symptom level may warrant medication

## 2022-06-24 NOTE — BH INPATIENT PSYCHIATRY PROGRESS NOTE - NSTXDCOTHRDATETRGT_PSY_ALL_CORE
20-Jun-2022
28-Jun-2022
28-Jun-2022
20-Jun-2022
20-Jun-2022
28-Jun-2022

## 2022-06-24 NOTE — BH INPATIENT PSYCHIATRY PROGRESS NOTE - NSCGIIMPROVESX_PSY_ALL_CORE
4 = No change - symptoms remain essentially unchanged
3 = Minimally improved - slightly better with little or no clinically meaningful reduction of symptoms.  Represents very little change in basic clinical status, level of care, or functional capacity.
4 = No change - symptoms remain essentially unchanged
3 = Minimally improved - slightly better with little or no clinically meaningful reduction of symptoms.  Represents very little change in basic clinical status, level of care, or functional capacity.
3 = Minimally improved - slightly better with little or no clinically meaningful reduction of symptoms.  Represents very little change in basic clinical status, level of care, or functional capacity.
4 = No change - symptoms remain essentially unchanged
3 = Minimally improved - slightly better with little or no clinically meaningful reduction of symptoms.  Represents very little change in basic clinical status, level of care, or functional capacity.
4 = No change - symptoms remain essentially unchanged
4 = No change - symptoms remain essentially unchanged

## 2022-06-24 NOTE — BH INPATIENT PSYCHIATRY PROGRESS NOTE - NSBHCONSULTIPREASON_PSY_A_CORE
danger to others; mental illness expected to respond to inpatient care

## 2022-06-24 NOTE — BH INPATIENT PSYCHIATRY PROGRESS NOTE - NSTXDCOTHRGOAL_PSY_ALL_CORE
Pt will comply with treatment with an improvement in his symptoms, ability to participate in his care, acceptance of substance abuse services.
Pt will comply with treatment with an improvement in his symptoms, ability to participate in his care, acceptance of substance abuse services.
Pt will comply with medication and outpatient follow up plan, with an improvement symptoms and overall functioning
Pt will comply with treatment with an improvement in his symptoms, ability to participate in his care, acceptance of substance abuse services.
Pt will comply with medication and outpatient follow up plan, with an improvement symptoms and overall functioning
Pt will comply with treatment with an improvement in his symptoms, ability to participate in his care, acceptance of substance abuse services.
Pt will comply with medication and outpatient follow up plan, with an improvement symptoms and overall functioning

## 2022-06-24 NOTE — BH INPATIENT PSYCHIATRY PROGRESS NOTE - NSBHATTESTCOMMENTATTENDFT_PSY_A_CORE
Pt continues to show limited insight, however compliant with care, communicating with his family over the phone. Has mantained behavioral control in the unit and has required no PRNs for agitation this week, 
On encounter today pt is calm. Discussed event yesterday when pt became upset with another peer and was able to stay in control and validated pt for his ability to remove himself from the situation. Np PRNs needed. Pt compliant with treatment and amenable to outpatient follow-up. Treatment team discussed projected discharge tomorrow with patient's wife and son. They verbalized agreement with plan and denied having any acute safety concerns at this time. 
Pt has remained under good behavioral control over the past week, has not required any PRNs or codes called for agitation or behavioral outbursts. Pt shows improvements in terms of frustration tolerance and irritability. Pt still present limited insight into the events that led up to his hospitalization but is now able to acknowledge that his behavior was concerning to his loved ones and expresses willingness in listening to them and continuing treatment. Patient's family has denied having any acute safety concerns at this time. Pt no longer presenting any behaviors, signs or symptoms that would make him a risk to self or others. Pt no longer meets criteria for inpatient psychiatric treatment and may continue his care in the outpatient setting.

## 2022-06-24 NOTE — BH INPATIENT PSYCHIATRY PROGRESS NOTE - NSTXALCDRGGOAL_PSY_ALL_CORE
Will not display signs of withdrawal

## 2022-06-24 NOTE — BH INPATIENT PSYCHIATRY PROGRESS NOTE - NSBHATTESTSTAFFAMEND_PSY_A_CORE
I have personally seen and examined this patient. I fully participated in the care of this patient. I have made amendments to the documentation where appropriate and otherwise agree with the history, physical exam, and plan as documented by the
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
## 2022-06-24 NOTE — BH INPATIENT PSYCHIATRY PROGRESS NOTE - NSICDXBHPRIMARYDX_PSY_ALL_CORE
TBI (traumatic brain injury)   S06.9X9A  
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
## 2022-06-24 NOTE — BH INPATIENT PSYCHIATRY PROGRESS NOTE - NSTXIMPULSDATETRGT_PSY_ALL_CORE
18-Jun-2022
30-Jun-2022
23-Jun-2022
18-Jun-2022
23-Jun-2022
18-Jun-2022
18-Jun-2022
23-Jun-2022

## 2022-06-24 NOTE — BH INPATIENT PSYCHIATRY PROGRESS NOTE - MSE OPTIONS
Unstructured MSE

## 2022-06-24 NOTE — BH INPATIENT PSYCHIATRY PROGRESS NOTE - NSBHCONSBHPROVDETAILS_PSY_A_CORE  FT
Discussed with Dr. Valadez 6/13

## 2022-06-24 NOTE — BH INPATIENT PSYCHIATRY PROGRESS NOTE - NSTXPROBALCDRG_PSY_ALL_CORE
ALCOHOL OR DRUG WITHDRAWAL

## 2022-06-24 NOTE — BH INPATIENT PSYCHIATRY PROGRESS NOTE - NSTXDCOTHRPROGRES_PSY_ALL_CORE
No Change

## 2022-07-01 NOTE — SOCIAL WORK POST DISCHARGE FOLLOW UP NOTE - NSBHSWFOLLOWUP_PSY_ALL_CORE_FT
Writer learned that pt did not link with his 6/28/22 [facility] intake.  Pt reportedly declined to participate, or reschedule.  Writer has been trying pt's cell phone, VM is full, and pt's home phone, with no answer.  Writer spoke with pt's spouse, Mihir Shearer, who reported pt isn't planning to attend follow up, and is not taking his medication.  She reports he has been calm, She has been direct with him about not tolerating any agitated behavior toward her. Spouse stated she would pass on my message to pt to call to discuss his follow up.  Letter from SW department will be sent, offering encouragement and assistance in setting up follow up.  As per treatment team, pt was not a danger to self or others at discharge.

## 2024-10-11 NOTE — ED PROVIDER NOTE - CCCP TRG CHIEF CMPLNT
Discontinue Regimen: Iron 65mg QOD (pt had already discontinued as directed)\\n\\nclobetasol 0.05 % scalp solution Apply bid to area of hair loss on scalp x 2 weeks.  Take a 2 week break then repeat\\n\\nVitamin C 500 mg qod (had already discontinued as directed) Continue Regimen: Rogaine Detail Level: Zone left hand injury.

## 2024-12-03 NOTE — ED ADULT TRIAGE NOTE - PAIN RATING/NUMBER SCALE (0-10): REST
0